# Patient Record
Sex: MALE | Race: BLACK OR AFRICAN AMERICAN | NOT HISPANIC OR LATINO | Employment: FULL TIME | URBAN - METROPOLITAN AREA
[De-identification: names, ages, dates, MRNs, and addresses within clinical notes are randomized per-mention and may not be internally consistent; named-entity substitution may affect disease eponyms.]

---

## 2017-01-14 ENCOUNTER — HOSPITAL ENCOUNTER (EMERGENCY)
Facility: HOSPITAL | Age: 42
Discharge: HOME | End: 2017-01-14
Attending: EMERGENCY MEDICINE

## 2017-01-14 VITALS
WEIGHT: 220 LBS | BODY MASS INDEX: 32.58 KG/M2 | HEIGHT: 69 IN | TEMPERATURE: 99 F | SYSTOLIC BLOOD PRESSURE: 158 MMHG | RESPIRATION RATE: 20 BRPM | OXYGEN SATURATION: 99 % | HEART RATE: 54 BPM | DIASTOLIC BLOOD PRESSURE: 79 MMHG

## 2017-01-14 DIAGNOSIS — R07.9 CHEST PAIN: ICD-10-CM

## 2017-01-14 DIAGNOSIS — R03.0 ELEVATED BLOOD PRESSURE READING: Primary | ICD-10-CM

## 2017-01-14 LAB
ALBUMIN SERPL BCP-MCNC: 4 G/DL
ALP SERPL-CCNC: 38 U/L
ALT SERPL W/O P-5'-P-CCNC: 23 U/L
ANION GAP SERPL CALC-SCNC: 7 MMOL/L
AST SERPL-CCNC: 19 U/L
BASOPHILS # BLD AUTO: 0.01 K/UL
BASOPHILS NFR BLD: 0.1 %
BILIRUB SERPL-MCNC: 0.6 MG/DL
BUN SERPL-MCNC: 10 MG/DL
CALCIUM SERPL-MCNC: 8.7 MG/DL
CHLORIDE SERPL-SCNC: 109 MMOL/L
CO2 SERPL-SCNC: 26 MMOL/L
CREAT SERPL-MCNC: 1.2 MG/DL
D DIMER PPP IA.FEU-MCNC: <0.19 MG/L FEU
DIFFERENTIAL METHOD: ABNORMAL
EOSINOPHIL # BLD AUTO: 0.1 K/UL
EOSINOPHIL NFR BLD: 0.7 %
ERYTHROCYTE [DISTWIDTH] IN BLOOD BY AUTOMATED COUNT: 13.8 %
EST. GFR  (AFRICAN AMERICAN): >60 ML/MIN/1.73 M^2
EST. GFR  (NON AFRICAN AMERICAN): >60 ML/MIN/1.73 M^2
GLUCOSE SERPL-MCNC: 95 MG/DL
HCT VFR BLD AUTO: 38.1 %
HGB BLD-MCNC: 13.9 G/DL
LYMPHOCYTES # BLD AUTO: 3.7 K/UL
LYMPHOCYTES NFR BLD: 42.3 %
MCH RBC QN AUTO: 30.8 PG
MCHC RBC AUTO-ENTMCNC: 36.5 %
MCV RBC AUTO: 85 FL
MONOCYTES # BLD AUTO: 0.5 K/UL
MONOCYTES NFR BLD: 5.2 %
NEUTROPHILS # BLD AUTO: 4.5 K/UL
NEUTROPHILS NFR BLD: 51.7 %
PLATELET # BLD AUTO: 205 K/UL
PMV BLD AUTO: 9.1 FL
POTASSIUM SERPL-SCNC: 3.7 MMOL/L
PROT SERPL-MCNC: 7 G/DL
RBC # BLD AUTO: 4.51 M/UL
SODIUM SERPL-SCNC: 142 MMOL/L
TROPONIN I SERPL DL<=0.01 NG/ML-MCNC: <0.006 NG/ML
WBC # BLD AUTO: 8.79 K/UL

## 2017-01-14 PROCEDURE — 99284 EMERGENCY DEPT VISIT MOD MDM: CPT | Mod: 25

## 2017-01-14 PROCEDURE — 93005 ELECTROCARDIOGRAM TRACING: CPT

## 2017-01-14 PROCEDURE — 25000003 PHARM REV CODE 250: Performed by: EMERGENCY MEDICINE

## 2017-01-14 PROCEDURE — 96374 THER/PROPH/DIAG INJ IV PUSH: CPT

## 2017-01-14 PROCEDURE — 80053 COMPREHEN METABOLIC PANEL: CPT

## 2017-01-14 PROCEDURE — 84484 ASSAY OF TROPONIN QUANT: CPT

## 2017-01-14 PROCEDURE — 96361 HYDRATE IV INFUSION ADD-ON: CPT

## 2017-01-14 PROCEDURE — 85025 COMPLETE CBC W/AUTO DIFF WBC: CPT

## 2017-01-14 PROCEDURE — 85379 FIBRIN DEGRADATION QUANT: CPT

## 2017-01-14 RX ORDER — FAMOTIDINE 10 MG/ML
20 INJECTION INTRAVENOUS
Status: COMPLETED | OUTPATIENT
Start: 2017-01-14 | End: 2017-01-14

## 2017-01-14 RX ORDER — ACETAMINOPHEN 325 MG/1
650 TABLET ORAL
Status: COMPLETED | OUTPATIENT
Start: 2017-01-14 | End: 2017-01-14

## 2017-01-14 RX ORDER — IBUPROFEN 600 MG/1
600 TABLET ORAL EVERY 6 HOURS PRN
Qty: 30 TABLET | Refills: 0 | Status: SHIPPED | OUTPATIENT
Start: 2017-01-14 | End: 2021-10-22 | Stop reason: CLARIF

## 2017-01-14 RX ADMIN — SODIUM CHLORIDE 1000 ML: 0.9 INJECTION, SOLUTION INTRAVENOUS at 10:01

## 2017-01-14 RX ADMIN — FAMOTIDINE 20 MG: 10 INJECTION, SOLUTION INTRAVENOUS at 10:01

## 2017-01-14 RX ADMIN — ACETAMINOPHEN 650 MG: 325 TABLET ORAL at 09:01

## 2017-01-14 NOTE — ED AVS SNAPSHOT
[facility]  [place] [id number]               Gus Valles Jr.   2017  8:33 PM   ED    Description:  Male : 1975   Department:  [facility]           Your Care was Coordinated By:     Provider Role From To    Candace Alvarez MD Attending Provider 17 [phone] --      Reason for Visit     Chest Pain           Diagnoses this Visit        Comments    Elevated blood pressure reading    -  Primary     Chest pain           ED Disposition     ED Disposition Condition Comment    Discharge             To Do List            These Medications        Disp Refills Start End    ibuprofen (ADVIL,MOTRIN) 600 MG tablet 30 tablet 0 2017     Take 1 tablet (600 mg total) by mouth every 6 (six) hours as needed for Pain. - Oral      [facility]     [facility] -  Assistance  Registered nurses in the [facility] provide clinical advisement, health education, appointment booking, and other advisory services.  Call for this free service at [phone].             Medications           Message regarding Medications     Verify the changes and/or additions to your medication regime listed below are the same as discussed with your clinician today.  If any of these changes or additions are incorrect, please notify your healthcare provider.        START taking these NEW medications        Refills    ibuprofen (ADVIL,MOTRIN) 600 MG tablet 0    Sig: Take 1 tablet (600 mg total) by mouth every 6 (six) hours as needed for Pain.    Class: Print    Route: Oral      These medications were administered today        Dose Freq    sodium chloride 0.9% bolus 1,000 mL 1,000 mL ED 1 Time    Sig: Inject 1,000 mLs into the vein ED 1 Time.    Class: Normal    Route: Intravenous    famotidine (PF) 20 mg/2 mL injection 20 mg 20 mg ED 1 Time    Sig: Inject 2 mLs (20 mg total) into the vein ED 1 Time.    Class: Normal    Route:  "Intravenous    acetaminophen tablet 650 mg 650 mg ED 1 Time    Sig: Take 2 tablets (650 mg total) by mouth ED 1 Time.    Class: Normal    Route: Oral           Verify that the below list of medications is an accurate representation of the medications you are currently taking.  If none reported, the list may be blank. If incorrect, please contact your healthcare provider. Carry this list with you in case of emergency.           Current Medications     aspirin 81 MG Chew Take 81 mg by mouth once daily.    cephALEXin (KEFLEX) 500 MG capsule Take 500 mg by mouth 4 (four) times daily.    hydrocodone-acetaminophen 5-325mg (NORCO) 5-325 mg per tablet Take 1 tablet by mouth every 4 (four) hours as needed for Pain.    ibuprofen (ADVIL,MOTRIN) 600 MG tablet Take 1 tablet (600 mg total) by mouth every 6 (six) hours as needed for Pain.    lisinopril-hydrochlorothiazide (PRINZIDE,ZESTORETIC) 10-12.5 mg per tablet Take 1 tablet by mouth once daily. Establish care with a primary physician for further management.    oxycodone-acetaminophen (PERCOCET) 5-325 mg per tablet Take 1 tablet by mouth every 4 (four) hours as needed for Pain.           Clinical Reference Information           Your Vitals Were     BP Pulse Temp Resp Height Weight    174/100 68 98.5 °F (36.9 °C) (Oral) 20 5' 9" (1.753 m) 99.8 kg (220 lb)    SpO2 BMI             99% 32.49 kg/m2         Allergies as of 1/14/2017     No Known Allergies      Immunizations Administered on Date of Encounter - 1/14/2017     None      ED Micro, Lab, POCT     Start Ordered       Status Ordering Provider    01/14/17 2124 01/14/17 2123  CBC auto differential  STAT      Final result     01/14/17 2124 01/14/17 2123  Comprehensive metabolic panel  STAT      Final result     01/14/17 2124 01/14/17 2123  D dimer, quantitative  STAT      Final result     01/14/17 2124 01/14/17 2123  Troponin I  STAT      Final result       ED Imaging Orders     Start Ordered       Status Ordering Provider    " 01/14/17 2039 01/14/17 2038  X-Ray Chest PA And Lateral  1 time imaging      Final result         Discharge Instructions         Noncardiac Chest Pain    Based on your visit today, the health care provider doesnt know what is causing your chest pain. In most cases, people who come to the emergency department with chest pain dont have a problem with their heart. Instead, the pain is caused by other conditions. These may be problems with the lungs, muscles, bones, digestive tract, nerves, or mental health.  Lung problems  · Inflammation around the lungs (pleurisy)  · Collapsed lung (pneumothorax)  · Fluid around the lungs (pleural effusion)  · Lung cancer. This is a rare cause of chest pain.  Muscle or bone problems  · Inflamed cartilage between the ribs (pleurisy)  · Fibromyalgia  · Rheumatoid arthritis  Digestive system problems  · Reflux  · Stomach ulcer  · Spasms of the esophagus  · Gall stones  · Gallbladder inflammation  Mental health conditions  · Panic or anxiety attacks  · Emotional distress  Your condition doesnt seem serious and your pain doesnt appear to be coming from your heart. But sometimes the signs of a serious problem take more time to appear. Watch for the warning signs listed below.  Home care  Follow these guidelines when caring for yourself at home:  · Rest today and avoid strenuous activity.  · Take any prescribed medicine as directed.  Follow-up care  Follow up with your health care provider, or as advised, if you dont start to feel better within 24 hours.  When to seek medical advice  Call your health care provider right away if any of these occur:  · A change in the type of pain. Call if it feels different, becomes more serious, lasts longer, or begins to spread into your shoulder, arm, neck, jaw, or back.  · Shortness of breath  · You feel more pain when you breathe  · Cough with dark-colored mucus or blood  · Weakness, dizziness, or fainting  · Fever of 100.4ºF (38ºC) or higher, or as  directed by your health care provider  · Swelling, pain, or redness in one leg  © 4239-0429 [employer]. [address] [place] [address], [place] [address]. All rights reserved. This information is not intended as a substitute for professional medical care. Always follow your healthcare professional's instructions.          Chest Wall Pain: Costochondritis    The chest pain that you have had today is caused by costochondritis. This condition is caused by an inflammation of the cartilage joining your ribs to your breastbone. It is not caused by heart or lung problems. The inflammation may have been brought on by a blow to the chest, lifting heavy objects, intense exercise, or an illness that made you cough and sneeze. It often occurs during times of emotional stress. It can be painful, but it is not dangerous. It usually goes away in 1 to 2 weeks. But it may happen again. Rarely, a more serious condition may cause symptoms similar to costochondritis. Thats why its important to watch for the warning signs listed below.  Home care  Follow these guidelines when caring for yourself at home:  · If you feel that emotional stress is a cause of your condition, try to figure out the sources of that stress. It may not be obvious! Learn ways to deal with the stress in your life. This can include regular exercise, muscle relaxation, meditation, or simply taking time out for yourself. For more information about this, talk with your health care provider. Or go to your local library and look at books on stress reduction.  · You may use acetaminophen or ibuprofen to control pain, unless another pain medicine was prescribed. If you have liver disease or ever had a stomach ulcer, talk with your health care provider before using these medicines.  · You can also help ease pain by using a hot, wet compress or heating pad. Use this with or without a medicated skin cream that helps relieves pain.  · Do stretching exercise as  advised by your provider.  · Take any prescribed medicines as directed.  Follow-up care  Follow up with your health care provider, or as advised, if you do not start to get better in the next 2 days.  When to seek medical advice  Call your health care provider right away if any of these occur:  · A change in the type of pain. Call if it feels different, becomes more serious, lasts longer, or spreads into your shoulder, arm, neck, jaw, or back.  · Shortness of breath or pain gets worse when you breathe  · Weakness, dizziness, or fainting  · Cough with dark-colored sputum (phlegm) or blood  · Abdominal pain  · Dark red or black stools  · Fever of 100.4ºF (38ºC) or higher, or as directed by your health care provider  © 9468-2788 [employer]. [address] [place] [address]. All rights reserved. This information is not intended as a substitute for professional medical care. Always follow your healthcare professional's instructions.          [facility] Sign-Up     Activating your [facility] account is as easy as 1-2-3!     1) Visit [email or web address], select Sign Up Now, enter this activation code and your date of birth, then select Next.  [email or web address]  Expires: 2/28/2017 11:14 PM      2) Create a username and password to use when you visit [facility] in the future and select a security question in case you lose your password and select Next.    3) Enter your e-mail address and click Sign Up!    Additional Information  If you have questions, please e-mail [email or web address] or call [phone] to talk to our [facility] staff. Remember, [facility] is NOT to be used for urgent needs. For medical emergencies, dial 911.          [facility] complies with applicable Federal civil rights laws and does not discriminate on the basis of race, color, national origin, age, disability, or sex.        Language Assistance Services     ATTENTION: Language assistance services are available, free  of charge. Please call [phone].      ATENCIÓN: Si habla español, tiene a del valle disposición servicios gratuitos de asistencia lingüística. Llame al [phone].     CHÚ Ý: N?u b?n nói Ti?ng Vi?t, có các d?ch v? h? tr? ngôn ng? mi?n phí dành cho b?n. G?i s? [phone].

## 2017-01-15 NOTE — ED PROVIDER NOTES
Encounter Date: 1/14/2017    SCRIBE #1 NOTE: I, Rios Laird, am scribing for, and in the presence of,  Candace Alvarez MD . I have scribed the following portions of the note - Other sections scribed: HPI, ROS and PE .       History     Chief Complaint   Patient presents with    Chest Pain     Right sided chest pain since this AM, constant, tightness w/ associated SOB.      Review of patient's allergies indicates:  No Known Allergies  HPI Comments: CC: Chest pain     HPI: This 41 y.o female with PMHx of HTN presents to the ED c/o acute onset constant and progressively worsening R chest pain (6/10) that started 13 hours ago. Pt reports pain started after getting off his truck and walking.  Additionally, pt reports it hurts to breath deeply. Pt denies pain after eating. Pt denies chest pain with palpations, SOB, and cough. Pt attempts treatment with Mallory, which provided slight relief. Pt denies recent heavy lifting, long distance travel, history of blood clots in legs. Pt denies fever, chills, sore throat, or any other associated symptoms.  Pt reports similar chest pain last month, but symptoms resolved and did not receive medical care. Pt states he has untreated HTN. Pt states he is not on any daily medication. Pt states he takes fish oil and potassium vitamins daily.     The history is provided by the patient. No  was used.     Past Medical History   Diagnosis Date    Hypertension      Past Medical History Pertinent Negatives   Diagnosis Date Noted    Asthma 3/19/2013    Cancer 3/19/2013    Diabetes mellitus 3/19/2013    Migraine headache 12/10/2013     Past Surgical History   Procedure Laterality Date    Cholecystectomy       Family History   Problem Relation Age of Onset    Hypertension Mother     Diabetes Father     Hypertension Father      Social History   Substance Use Topics    Smoking status: Never Smoker    Smokeless tobacco: Never Used    Alcohol use Yes      Comment:  social use     Review of Systems   Constitutional: Negative for chills and fever.   HENT: Negative for rhinorrhea and sore throat.    Eyes: Negative for pain.   Respiratory: Negative for cough and shortness of breath.    Cardiovascular: Positive for chest pain.   Gastrointestinal: Negative for diarrhea, nausea and vomiting.   Genitourinary: Negative for dysuria and frequency.   Musculoskeletal: Negative for back pain and neck pain.   Skin: Negative for rash.   Neurological: Negative for light-headedness and headaches.       Physical Exam   Initial Vitals   BP Pulse Resp Temp SpO2   01/14/17 2030 01/14/17 2030 01/14/17 2030 01/14/17 2030 01/14/17 2030   221/112 75 20 98.5 °F (36.9 °C) 99 %     Physical Exam    Nursing note and vitals reviewed.  Constitutional: He appears well-developed and well-nourished.  Non-toxic appearance. No distress.   HENT:   Head: Normocephalic and atraumatic.   Eyes: Conjunctivae and EOM are normal. Pupils are equal, round, and reactive to light.   No conjunctival pallor   Neck: Normal range of motion. Neck supple. No JVD present.   Cardiovascular: Normal rate and normal heart sounds.   Pulmonary/Chest: Effort normal and breath sounds normal. No respiratory distress. He exhibits no tenderness.   Abdominal: Soft. Normal appearance and bowel sounds are normal. There is no tenderness.   Musculoskeletal: Normal range of motion.        Right lower leg: He exhibits no tenderness.        Left lower leg: He exhibits no tenderness.   Neurological: He is alert and oriented to person, place, and time. He has normal strength. No cranial nerve deficit or sensory deficit.   Skin: Skin is warm and dry.   Psychiatric: He has a normal mood and affect. His behavior is normal. Thought content normal.         ED Course   Procedures  Labs Reviewed   CBC W/ AUTO DIFFERENTIAL   COMPREHENSIVE METABOLIC PANEL   D DIMER, QUANTITATIVE   TROPONIN I     EKG Readings: (Independently Interpreted)   Initial Reading: No  STEMI.   Normal sinus rhythm, rate 60, normal axis, normal intervals, no STEMI          Medical Decision Making:   Initial Assessment:   Urgent evaluation of 41-year-old gentleman with poorly controlled hypertension here with 1 day of right-sided chest discomfort, her son deep inspiration.  Possible heavy lifting yesterday when moving a swing, but no other trauma.  Patient denies cough, no hemoptysis.  No history of DVT or PE, or recent illness.  Vital signs notable for elevated blood pressure, otherwise no tachypnea or hypoxia.  Patient is well-appearing in no respiratory distress.  Differential includes ACS, pericarditis, pneumothorax, pneumonia, costochondritis, muscular strain, or PE.   Clinical Tests:   Lab Tests: Ordered and Reviewed  Radiological Study: Ordered and Reviewed  Medical Tests: Ordered and Reviewed  ED Management:  Patient felt much improved after Tylenol, IV fluids and Pepcid.  Chest x-ray without infiltrate, nor pneumothorax, negative troponin, no evidence of anemia.  D-dimer.  Suspect etiology is muscular or  Costrochondritis, will dc home with NSAIDs and strict fu PCP regarding HTN- which is now 179/97 without intervention, and otherwise, no evidence of end organ damage.    Pt agreeable to plan for discharge home. I feel that pt is stable for discharge and management as an outpatient and no further intervention is needed at this time. Pt is comfortable returning to the ED if needed with any new or worsening symptoms. ED course and all test results discussed with patient, all questions answered, patient demonstrated understanding.The patient was advised to follow up with a primary care provider in 24-48 hours.                Scribe Attestation:   Scribe #1: I performed the above scribed service and the documentation accurately describes the services I performed. I attest to the accuracy of the note.    Attending Attestation:           Physician Attestation for Scribe:  Physician Attestation  Statement for Scribe #1: I, Candace Alvarez MD , reviewed documentation, as scribed by Rios Laird  in my presence, and it is both accurate and complete.                 ED Course     Clinical Impression:     1. Elevated blood pressure reading    2. Chest pain        Disposition:   Disposition: Discharged  Condition: Stable       Candace Alvarez MD  01/15/17 0202       Candace Alvarez MD  01/15/17 0222

## 2017-01-15 NOTE — ED TRIAGE NOTES
41 y.o male to the ED c/o 6/10 R sided chest pain since this morning.   PMH of HTN not currently on any medications.   Pain described as pressure and constant.   +SOB with deep inspiration, denies n/v/f.   Will continue to monitor.

## 2017-01-15 NOTE — DISCHARGE INSTRUCTIONS
Noncardiac Chest Pain    Based on your visit today, the health care provider doesnt know what is causing your chest pain. In most cases, people who come to the emergency department with chest pain dont have a problem with their heart. Instead, the pain is caused by other conditions. These may be problems with the lungs, muscles, bones, digestive tract, nerves, or mental health.  Lung problems  · Inflammation around the lungs (pleurisy)  · Collapsed lung (pneumothorax)  · Fluid around the lungs (pleural effusion)  · Lung cancer. This is a rare cause of chest pain.  Muscle or bone problems  · Inflamed cartilage between the ribs (pleurisy)  · Fibromyalgia  · Rheumatoid arthritis  Digestive system problems  · Reflux  · Stomach ulcer  · Spasms of the esophagus  · Gall stones  · Gallbladder inflammation  Mental health conditions  · Panic or anxiety attacks  · Emotional distress  Your condition doesnt seem serious and your pain doesnt appear to be coming from your heart. But sometimes the signs of a serious problem take more time to appear. Watch for the warning signs listed below.  Home care  Follow these guidelines when caring for yourself at home:  · Rest today and avoid strenuous activity.  · Take any prescribed medicine as directed.  Follow-up care  Follow up with your health care provider, or as advised, if you dont start to feel better within 24 hours.  When to seek medical advice  Call your health care provider right away if any of these occur:  · A change in the type of pain. Call if it feels different, becomes more serious, lasts longer, or begins to spread into your shoulder, arm, neck, jaw, or back.  · Shortness of breath  · You feel more pain when you breathe  · Cough with dark-colored mucus or blood  · Weakness, dizziness, or fainting  · Fever of 100.4ºF (38ºC) or higher, or as directed by your health care provider  · Swelling, pain, or redness in one leg  © 1719-1190 [employer] [address]  [place] [address]. All rights reserved. This information is not intended as a substitute for professional medical care. Always follow your healthcare professional's instructions.          Chest Wall Pain: Costochondritis    The chest pain that you have had today is caused by costochondritis. This condition is caused by an inflammation of the cartilage joining your ribs to your breastbone. It is not caused by heart or lung problems. The inflammation may have been brought on by a blow to the chest, lifting heavy objects, intense exercise, or an illness that made you cough and sneeze. It often occurs during times of emotional stress. It can be painful, but it is not dangerous. It usually goes away in 1 to 2 weeks. But it may happen again. Rarely, a more serious condition may cause symptoms similar to costochondritis. Thats why its important to watch for the warning signs listed below.  Home care  Follow these guidelines when caring for yourself at home:  · If you feel that emotional stress is a cause of your condition, try to figure out the sources of that stress. It may not be obvious! Learn ways to deal with the stress in your life. This can include regular exercise, muscle relaxation, meditation, or simply taking time out for yourself. For more information about this, talk with your health care provider. Or go to your local library and look at books on stress reduction.  · You may use acetaminophen or ibuprofen to control pain, unless another pain medicine was prescribed. If you have liver disease or ever had a stomach ulcer, talk with your health care provider before using these medicines.  · You can also help ease pain by using a hot, wet compress or heating pad. Use this with or without a medicated skin cream that helps relieves pain.  · Do stretching exercise as advised by your provider.  · Take any prescribed medicines as directed.  Follow-up care  Follow up with your health care  provider, or as advised, if you do not start to get better in the next 2 days.  When to seek medical advice  Call your health care provider right away if any of these occur:  · A change in the type of pain. Call if it feels different, becomes more serious, lasts longer, or spreads into your shoulder, arm, neck, jaw, or back.  · Shortness of breath or pain gets worse when you breathe  · Weakness, dizziness, or fainting  · Cough with dark-colored sputum (phlegm) or blood  · Abdominal pain  · Dark red or black stools  · Fever of 100.4ºF (38ºC) or higher, or as directed by your health care provider  © 1649-2030 [employer]. [address] [place] [address], [place] [address]. All rights reserved. This information is not intended as a substitute for professional medical care. Always follow your healthcare professional's instructions.

## 2017-08-16 ENCOUNTER — HOSPITAL ENCOUNTER (EMERGENCY)
Facility: HOSPITAL | Age: 42
Discharge: HOME | End: 2017-08-16
Attending: EMERGENCY MEDICINE | Admitting: EMERGENCY MEDICINE

## 2017-08-16 VITALS
HEIGHT: 68 IN | WEIGHT: 220 LBS | TEMPERATURE: 100 F | BODY MASS INDEX: 33.34 KG/M2 | HEART RATE: 86 BPM | DIASTOLIC BLOOD PRESSURE: 105 MMHG | SYSTOLIC BLOOD PRESSURE: 179 MMHG | RESPIRATION RATE: 18 BRPM | OXYGEN SATURATION: 99 %

## 2017-08-16 DIAGNOSIS — J36 PERITONSILLAR ABSCESS: Primary | ICD-10-CM

## 2017-08-16 LAB
ANION GAP SERPL CALC-SCNC: 10 MMOL/L
BASOPHILS # BLD AUTO: 0.02 K/UL
BASOPHILS NFR BLD: 0.1 %
BUN SERPL-MCNC: 10 MG/DL
CALCIUM SERPL-MCNC: 9.4 MG/DL
CHLORIDE SERPL-SCNC: 107 MMOL/L
CO2 SERPL-SCNC: 23 MMOL/L
CREAT SERPL-MCNC: 1.1 MG/DL
DEPRECATED S PYO AG THROAT QL EIA: NEGATIVE
DIFFERENTIAL METHOD: ABNORMAL
EOSINOPHIL # BLD AUTO: 0 K/UL
EOSINOPHIL NFR BLD: 0.1 %
ERYTHROCYTE [DISTWIDTH] IN BLOOD BY AUTOMATED COUNT: 13.5 %
EST. GFR  (AFRICAN AMERICAN): >60 ML/MIN/1.73 M^2
EST. GFR  (NON AFRICAN AMERICAN): >60 ML/MIN/1.73 M^2
GLUCOSE SERPL-MCNC: 105 MG/DL
HCT VFR BLD AUTO: 38.9 %
HGB BLD-MCNC: 14.1 G/DL
LACTATE SERPL-SCNC: 0.8 MMOL/L
LYMPHOCYTES # BLD AUTO: 3 K/UL
LYMPHOCYTES NFR BLD: 20 %
MCH RBC QN AUTO: 30.9 PG
MCHC RBC AUTO-ENTMCNC: 36.2 G/DL
MCV RBC AUTO: 85 FL
MONOCYTES # BLD AUTO: 1 K/UL
MONOCYTES NFR BLD: 6.8 %
NEUTROPHILS # BLD AUTO: 11 K/UL
NEUTROPHILS NFR BLD: 73 %
PLATELET # BLD AUTO: 177 K/UL
PMV BLD AUTO: 9 FL
POTASSIUM SERPL-SCNC: 3.9 MMOL/L
RBC # BLD AUTO: 4.56 M/UL
SODIUM SERPL-SCNC: 140 MMOL/L
WBC # BLD AUTO: 15.04 K/UL

## 2017-08-16 PROCEDURE — 25500020 PHARM REV CODE 255: Performed by: EMERGENCY MEDICINE

## 2017-08-16 PROCEDURE — 96365 THER/PROPH/DIAG IV INF INIT: CPT

## 2017-08-16 PROCEDURE — 80048 BASIC METABOLIC PNL TOTAL CA: CPT

## 2017-08-16 PROCEDURE — 99285 EMERGENCY DEPT VISIT HI MDM: CPT

## 2017-08-16 PROCEDURE — 85025 COMPLETE CBC W/AUTO DIFF WBC: CPT

## 2017-08-16 PROCEDURE — 87081 CULTURE SCREEN ONLY: CPT

## 2017-08-16 PROCEDURE — 87147 CULTURE TYPE IMMUNOLOGIC: CPT

## 2017-08-16 PROCEDURE — 25000003 PHARM REV CODE 250: Performed by: EMERGENCY MEDICINE

## 2017-08-16 PROCEDURE — 96375 TX/PRO/DX INJ NEW DRUG ADDON: CPT

## 2017-08-16 PROCEDURE — 99284 EMERGENCY DEPT VISIT MOD MDM: CPT | Mod: ,,, | Performed by: PHYSICIAN ASSISTANT

## 2017-08-16 PROCEDURE — 63600175 PHARM REV CODE 636 W HCPCS: Performed by: EMERGENCY MEDICINE

## 2017-08-16 PROCEDURE — 63600175 PHARM REV CODE 636 W HCPCS: Performed by: PHYSICIAN ASSISTANT

## 2017-08-16 PROCEDURE — 42700 I&D ABSCESS PERITONSILLAR: CPT

## 2017-08-16 PROCEDURE — 96372 THER/PROPH/DIAG INJ SC/IM: CPT

## 2017-08-16 PROCEDURE — 87076 CULTURE ANAEROBE IDENT EACH: CPT | Mod: 59

## 2017-08-16 PROCEDURE — 25000003 PHARM REV CODE 250: Performed by: PHYSICIAN ASSISTANT

## 2017-08-16 PROCEDURE — 87070 CULTURE OTHR SPECIMN AEROBIC: CPT

## 2017-08-16 PROCEDURE — 83605 ASSAY OF LACTIC ACID: CPT

## 2017-08-16 PROCEDURE — S0077 INJECTION, CLINDAMYCIN PHOSP: HCPCS | Performed by: PHYSICIAN ASSISTANT

## 2017-08-16 PROCEDURE — 94640 AIRWAY INHALATION TREATMENT: CPT

## 2017-08-16 PROCEDURE — 87880 STREP A ASSAY W/OPTIC: CPT

## 2017-08-16 PROCEDURE — 87075 CULTR BACTERIA EXCEPT BLOOD: CPT

## 2017-08-16 RX ORDER — OXYCODONE AND ACETAMINOPHEN 5; 325 MG/1; MG/1
1 TABLET ORAL EVERY 6 HOURS PRN
Qty: 18 TABLET | Refills: 0 | Status: SHIPPED | OUTPATIENT
Start: 2017-08-16 | End: 2017-08-23

## 2017-08-16 RX ORDER — CLINDAMYCIN HYDROCHLORIDE 300 MG/1
300 CAPSULE ORAL EVERY 8 HOURS
Qty: 21 CAPSULE | Refills: 0 | Status: SHIPPED | OUTPATIENT
Start: 2017-08-16 | End: 2017-08-23

## 2017-08-16 RX ORDER — CLINDAMYCIN PHOSPHATE 900 MG/50ML
900 INJECTION, SOLUTION INTRAVENOUS
Status: COMPLETED | OUTPATIENT
Start: 2017-08-16 | End: 2017-08-16

## 2017-08-16 RX ORDER — HYDROMORPHONE HYDROCHLORIDE 1 MG/ML
0.5 INJECTION, SOLUTION INTRAMUSCULAR; INTRAVENOUS; SUBCUTANEOUS
Status: COMPLETED | OUTPATIENT
Start: 2017-08-16 | End: 2017-08-16

## 2017-08-16 RX ORDER — DEXAMETHASONE SODIUM PHOSPHATE 4 MG/ML
12 INJECTION, SOLUTION INTRA-ARTICULAR; INTRALESIONAL; INTRAMUSCULAR; INTRAVENOUS; SOFT TISSUE
Status: COMPLETED | OUTPATIENT
Start: 2017-08-16 | End: 2017-08-16

## 2017-08-16 RX ORDER — LABETALOL HYDROCHLORIDE 5 MG/ML
10 INJECTION, SOLUTION INTRAVENOUS
Status: COMPLETED | OUTPATIENT
Start: 2017-08-16 | End: 2017-08-16

## 2017-08-16 RX ORDER — LIDOCAINE HYDROCHLORIDE AND EPINEPHRINE 10; 10 MG/ML; UG/ML
10 INJECTION, SOLUTION INFILTRATION; PERINEURAL
Status: COMPLETED | OUTPATIENT
Start: 2017-08-16 | End: 2017-08-16

## 2017-08-16 RX ORDER — LIDOCAINE HYDROCHLORIDE 40 MG/ML
2 INJECTION, SOLUTION RETROBULBAR
Status: COMPLETED | OUTPATIENT
Start: 2017-08-16 | End: 2017-08-16

## 2017-08-16 RX ORDER — MORPHINE SULFATE 10 MG/ML
4 INJECTION INTRAMUSCULAR; INTRAVENOUS; SUBCUTANEOUS ONCE
Status: COMPLETED | OUTPATIENT
Start: 2017-08-16 | End: 2017-08-16

## 2017-08-16 RX ORDER — METHYLPREDNISOLONE SODIUM SUCCINATE 125 MG/2ML
125 INJECTION INTRAMUSCULAR; INTRAVENOUS
Status: COMPLETED | OUTPATIENT
Start: 2017-08-16 | End: 2017-08-16

## 2017-08-16 RX ADMIN — IOHEXOL 70 ML: 350 INJECTION, SOLUTION INTRAVENOUS at 01:08

## 2017-08-16 RX ADMIN — TOPICAL ANESTHETIC: 200 SPRAY DENTAL; PERIODONTAL at 04:08

## 2017-08-16 RX ADMIN — MORPHINE SULFATE 4 MG: 10 INJECTION INTRAVENOUS at 01:08

## 2017-08-16 RX ADMIN — HYDROMORPHONE HYDROCHLORIDE 0.5 MG: 1 INJECTION, SOLUTION INTRAMUSCULAR; INTRAVENOUS; SUBCUTANEOUS at 04:08

## 2017-08-16 RX ADMIN — METHYLPREDNISOLONE SODIUM SUCCINATE 125 MG: 125 INJECTION, POWDER, FOR SOLUTION INTRAMUSCULAR; INTRAVENOUS at 01:08

## 2017-08-16 RX ADMIN — LABETALOL HYDROCHLORIDE 10 MG: 5 INJECTION, SOLUTION INTRAVENOUS at 01:08

## 2017-08-16 RX ADMIN — DEXAMETHASONE SODIUM PHOSPHATE 12 MG: 4 INJECTION, SOLUTION INTRAMUSCULAR; INTRAVENOUS at 04:08

## 2017-08-16 RX ADMIN — CLINDAMYCIN IN 5 PERCENT DEXTROSE 900 MG: 18 INJECTION, SOLUTION INTRAVENOUS at 04:08

## 2017-08-16 RX ADMIN — LIDOCAINE HYDROCHLORIDE 2 ML: 40 INJECTION, SOLUTION RETROBULBAR; TOPICAL at 01:08

## 2017-08-16 RX ADMIN — LIDOCAINE HYDROCHLORIDE,EPINEPHRINE BITARTRATE 10 ML: 10; .01 INJECTION, SOLUTION INFILTRATION; PERINEURAL at 04:08

## 2017-08-16 NOTE — CONSULTS
Otolaryngology - Head and Neck Surgery  Consultation Report    Consultation From: ED    Chief Complaint: peritonsillar abscess    History of Present Illness: 42 y.o. year old male presents with sore throat, odynophagia and ear pain for the last two days. He reports that he had a subjective fever at home. He has never had anything like this before. He went to outside ED where a CT scan was performed demonstrating a left peritonsillar abscess. He was sent to [facility] for ENT evaluation. No shortness of breath.    Review of Systems - Negative except above    Past Medical History: Patient has a past medical history of Hypertension.    Past Surgical History: Patient has a past surgical history that includes Cholecystectomy.    Social History: Patient reports that he has never smoked. He has never used smokeless tobacco. He reports that he drinks alcohol. He reports that he does not use drugs.    Family History: family history includes Diabetes in his father; Hypertension in his father and mother.    Medications:    clindamycin 900 MG/50 ML D5W  900 mg Intravenous ED 1 Time       Allergies: Patient has No Known Allergies.    Physical Exam:  Temp:  [98.7 °F (37.1 °C)-99.9 °F (37.7 °C)] 99.8 °F (37.7 °C)  Pulse:  [] 92  Resp:  [17-18] 18  SpO2:  [97 %-100 %] 97 %  BP: (160-209)/() 160/87        Constitutional: Well appearing / communicating.  NAD.  Eyes: EOM I Bilaterally  Head/Face: Normocephalic.  Negative paranasal sinus pressure/tenderness.  Salivary glands WNL.  House Brackmann I Bilaterally.  Right Ear: External Auditory Canal WNL,TM w/o masses/lesions/perforations.  Auricle WNL.  Left Ear: External Auditory Canal WNL,TM w/o masses/lesions/perforations. Auricle WNL.  Nose: No gross nasal septal deviation. Inferior Turbinates 2+ bilaterally. No septal perforation. No masses/lesions. External nasal skin without masses/lesions.  Oral Cavity: Gingiva/lips WNL.  FOM Soft, no masses palpated. Oral Tongue mobile.  Hard Palate WNL.   Oropharynx: BOT WNL. Bulging of left soft palate with slight displacement of uvula to right  Neck/Lymphatic: No LAD I-VI bilaterally.  No thyromegaly.  No masses noted on exam.  Neuro/Psychiatric: AOx3.  Normal mood and affect.   Cardiovascular: Normal carotid pulses bilaterally, no increasing jugular venous distention noted at cervical region bilaterally.    Respiratory: Normal respiratory effort, no stridor, no retractions noted.          CBC    Recent Labs  Lab 08/16/17  0034   WBC 15.04*   HGB 14.1   HCT 38.9*   MCV 85        BMP    Recent Labs  Lab 08/16/17 0034         K 3.9      CO2 23   BUN 10   CREATININE 1.1   CALCIUM 9.4       Imaging Results          CT Soft Tissue Neck With Contrast (Final result)  Result time 08/16/17 01:50:52    Final result by Kathleen Dunaway MD (08/16/17 01:50:52)                 Impression:      Asymmetric soft tissue swelling in the left palatine tonsillar region with small rim-enhancing collection concerning for small developing left peritonsillar abscess.      Preliminary findings were communicated from Dr. Dunaway to CARLITO Juan for Dr. JOSELITO ROMERO at 01:49:29 on 08/16/17.      Electronically signed by: KATHLEEN DUNAWAY MD  Date:     08/16/17  Time:    01:50              Narrative:    Clinical indication: 42 year-old male with left-sided throat pain.    Comparison: None.    Technique: 2.5 mm axial images with sagittal and coronal reformats were obtained through the neck following administration of 70 cc Omnipaque 350 IV contrast.    Findings:  The visualized portion of the brain demonstrates no gross abnormality.  Orbits are grossly normal in appearance.  Visualized paranasal sinuses and mastoid air cells are clear.  Parotid glands and submandibular glands are normal and symmetric in size.  Thyroid is unremarkable in appearance.    There is asymmetric soft tissue swelling seen in the region of the left palatine tonsils.  There  is small rim-enhancing collection concerning for developing left peritonsillar abscess which measures 1.3 x 0.7 x 1.4 cm.  No evidence of retropharyngeal abscess.  Mild mass effect seen upon the nasopharynx in this region.  Vocal cords are opposed presumably due to breath holding.  Airway is otherwise grossly patent.    Partially visualized lung apices are clear.  Vasculature of the neck appears grossly patent.  No acute osseous abnormalities identified.                               Assessment: 41 yo man presenting with left peritonsillar abscess     Plan:  -I&D performed, see below for procedure note.  -culture of purulent material collected, please send for analysis  -ok for discharge home with PO clindamycin, pain control  -instructed patient to return if symptoms worsen or he does not improve  -follow up with ENT as needed  -d/w Dr. Prince        Procedure: after informed consent was obtained, the patients tonsillar pillar was anesthetized with cetacaine spray. Next 3 ml of 1% lidocaine with epinephrine 1:100,000 was injected along the left anterior tonsillar pillar. An 18 gauge needle was used to aspirate from the left peritonsillar area, ~0.5 ml of pus was aspirated. Next a curvilinear incision was made along the anterior pillar. Approximately 5 ml of purulent material was expressed. A curved hemostat was used to break up loculations. Hemostasis was excellent. The procedure was deemed complete, the patient tolerated it well.

## 2017-08-16 NOTE — ED PROVIDER NOTES
Encounter Date: 8/15/2017    SCRIBE #1 NOTE: I, Lillie Estrella, am scribing for, and in the presence of,  Dr. Downs. I have scribed the following portions of the note - the APC attestation. Other sections scribed: X-ray.       History     Chief Complaint   Patient presents with    Headache     headache to right side with right eye pain x 3 hours.     Sore Throat     Sore throat x 2 days and worsened overnight and difficulty swallowing     Otalgia     Left ear ache x 2 days.     Transfer     Transfer from [facility] for peritonsilar abscess     42-year-old male transferred from [facility] for ENT evaluation.  Patient has a left-sided peritonsillar abscess identified on CT scan.  He received Solu-Medrol at outside facility but no other medications other than analgesics.  Airways patent.  Patient talking in complete sentences without a muffled voice.          Review of patient's allergies indicates:  No Known Allergies  Past Medical History:   Diagnosis Date    Hypertension      Past Surgical History:   Procedure Laterality Date    CHOLECYSTECTOMY       Family History   Problem Relation Age of Onset    Hypertension Mother     Diabetes Father     Hypertension Father      Social History   Substance Use Topics    Smoking status: Never Smoker    Smokeless tobacco: Never Used    Alcohol use Yes      Comment: social use     Review of Systems   Constitutional: Negative for fever.   HENT: Positive for ear pain, sore throat and trouble swallowing. Negative for dental problem, drooling, facial swelling and voice change.    Respiratory: Negative for shortness of breath.    Cardiovascular: Negative for chest pain.   Gastrointestinal: Negative for nausea.   Genitourinary: Negative for dysuria.   Musculoskeletal: Negative for back pain.   Skin: Negative for rash.   Neurological: Negative for weakness.   Hematological: Does not bruise/bleed easily.       Physical Exam     Initial Vitals [08/15/17 2136]   BP Pulse  Resp Temp SpO2   (!) 167/97 100 18 99.9 °F (37.7 °C) 98 %      MAP       120.33         Physical Exam    Constitutional: Vital signs are normal. He appears well-developed and well-nourished. No distress.   HENT:   Head: Normocephalic and atraumatic.   Right Ear: External ear normal.   Left Ear: External ear normal.   Left-sided peritonsillar abscess difficult to identify on physical exam but there is obvious edema    Otherwise normal exam   Eyes: Conjunctivae are normal.   Cardiovascular: Normal rate and regular rhythm. Exam reveals no friction rub.    No murmur heard.  Pulmonary/Chest: He has no rales. He exhibits no tenderness.   Abdominal: Soft. Normal appearance, normal aorta and bowel sounds are normal.   Musculoskeletal: Normal range of motion.   Neurological: He is alert and oriented to person, place, and time.   Skin: Skin is warm and intact.   Psychiatric: He has a normal mood and affect. His speech is normal and behavior is normal. Cognition and memory are normal.         ED Course   Procedures  Labs Reviewed   CBC W/ AUTO DIFFERENTIAL - Abnormal; Notable for the following:        Result Value    WBC 15.04 (*)     RBC 4.56 (*)     Hematocrit 38.9 (*)     MCHC 36.2 (*)     MPV 9.0 (*)     Gran # 11.0 (*)     All other components within normal limits   THROAT SCREEN, RAPID   CULTURE, STREP A,  THROAT   CULTURE, ANAEROBIC   CULTURE, AEROBIC  (SPECIFY SOURCE)   LACTIC ACID, PLASMA   BASIC METABOLIC PANEL          X-Rays:   Independently Interpreted Readings:   Other Readings:  CT of neck: peritonsillar abscess.     Medical Decision Making:   History:   Old Medical Records: I decided to obtain old medical records.  Independently Interpreted Test(s):   I have ordered and independently interpreted X-rays - see prior notes.  Clinical Tests:   Lab Tests: Ordered and Reviewed  Radiological Study: Ordered and Reviewed  ED Management:  42-year-old male with peritonsillar abscess.  Abscess drained in the ER by ENT.   Patient started on antibiotics and given Decadron.  We'll discharge home with analgesic medication and clindamycin.  Return instructions have been provided.  Patient stable for discharge.  Other:   I have discussed this case with another health care provider.       <> Summary of the Discussion: ENT            Scribe Attestation:   Scribe #1: I performed the above scribed service and the documentation accurately describes the services I performed. I attest to the accuracy of the note.    Attending Attestation:     Physician Attestation Statement for NP/PA:   I discussed this assessment and plan of this patient with the NP/PA, but I did not personally examine the patient. The face to face encounter was performed by the NP/PA.    Other NP/PA Attestation Additions:    History of Present Illness: Peritonsillar abscess.          Physician Attestation for Scribe:  Physician Attestation Statement for Scribe #1: I, Dr. Downs, reviewed documentation, as scribed by Lillie Estrella in my presence, and it is both accurate and complete.                 ED Course     Clinical Impression:   The encounter diagnosis was Peritonsillar abscess.    Disposition:   Disposition: Discharged  Condition: Stable                        Akbar Suarez PA-C  08/16/17 6287       Jorge Downs III, MD  08/16/17 [id number]

## 2017-08-16 NOTE — DISCHARGE INSTRUCTIONS
Take all antibiotics until completed  Take pain medication as needed as directed  Take this medication with caution as it may cause sleepiness or drowsiness  Return to the emergency department with any new complaints

## 2017-08-16 NOTE — ED TRIAGE NOTES
42 y.o male presents to the ED via personal transport w/ friend. The pt reports he has been having a constant frontal headache, sensitive to light/noise, left ear pain and a sore throat since last night.

## 2017-08-16 NOTE — ED PROVIDER NOTES
Encounter Date: 8/15/2017    SCRIBE #1 NOTE: I, Wally Villasenor, am scribing for, and in the presence of,  Kristopher Wood MD. I have scribed the following portions of the note - Other sections scribed: HPI, ROS.       History     Chief Complaint   Patient presents with    Headache     headache to right side with right eye pain x 3 hours.     Sore Throat     Sore throat x 2 days and worsened overnight and difficulty swallowing     Otalgia     Left ear ache x 2 days.      CC: Sore Throat    HPI: This 42 y.o. Male with PMHx HTN and PSHx cholecystectomy presents to the ED c/o severe sore throat which worsened last night, and L otalgia which began x2 days ago. Pt reports associated headache, fever, and elevated blood pressure today. Pt reports taking Nyquil Cold & Sinus and Theraflu at home as tx. Pt denies taking blood pressure medications. Pt denies swimming recently.      The history is provided by the patient and a significant other. No  was used.     Review of patient's allergies indicates:  No Known Allergies  Past Medical History:   Diagnosis Date    Hypertension      Past Surgical History:   Procedure Laterality Date    CHOLECYSTECTOMY       Family History   Problem Relation Age of Onset    Hypertension Mother     Diabetes Father     Hypertension Father      Social History   Substance Use Topics    Smoking status: Never Smoker    Smokeless tobacco: Never Used    Alcohol use Yes      Comment: social use     Review of Systems   Constitutional: Positive for fever. Negative for chills, diaphoresis and fatigue.   HENT: Positive for ear pain (L), sore throat and trouble swallowing. Negative for congestion, drooling, hearing loss, postnasal drip, sinus pressure and sneezing.    Respiratory: Negative for shortness of breath, wheezing and stridor.    Cardiovascular: Negative for chest pain.   Gastrointestinal: Negative for abdominal pain and nausea.   Genitourinary: Negative for dysuria.    Musculoskeletal: Negative for back pain.   Skin: Negative for rash.   Neurological: Positive for headaches. Negative for weakness.   Hematological: Does not bruise/bleed easily.       Physical Exam     Initial Vitals [08/15/17 2136]   BP Pulse Resp Temp SpO2   (!) 167/97 100 18 99.9 °F (37.7 °C) 98 %      MAP       120.33         Physical Exam    Vitals reviewed.  Constitutional: He appears well-developed and well-nourished.   HENT:   Head: Normocephalic and atraumatic.   Right Ear: Tympanic membrane normal.   Left Ear: Tympanic membrane normal.   Nose: Nose normal.   Mouth/Throat: Mucous membranes are normal. Uvula swelling present. No dental abscesses. Posterior oropharyngeal edema present.   Fullness in the left peritonsillar area.   Eyes: EOM are normal. Pupils are equal, round, and reactive to light.   Neck: Trachea normal, normal range of motion and full passive range of motion without pain. Neck supple. No stridor present.   Cardiovascular: Normal rate, regular rhythm, normal heart sounds and intact distal pulses.   Pulmonary/Chest: Breath sounds normal. No respiratory distress. He has no wheezes. He has no rhonchi. He has no rales.   Abdominal: Soft. Bowel sounds are normal.   Musculoskeletal: Normal range of motion.   Neurological: He is alert and oriented to person, place, and time.   Skin: Skin is warm and dry.   Psychiatric: He has a normal mood and affect.         ED Course   Procedures  Labs Reviewed   CBC W/ AUTO DIFFERENTIAL - Abnormal; Notable for the following:        Result Value    WBC 15.04 (*)     RBC 4.56 (*)     Hematocrit 38.9 (*)     MCHC 36.2 (*)     MPV 9.0 (*)     Gran # 11.0 (*)     All other components within normal limits   THROAT SCREEN, RAPID   CULTURE, STREP A,  THROAT   LACTIC ACID, PLASMA   BASIC METABOLIC PANEL             Medical Decision Making:   History:   Old Medical Records: I decided to obtain old medical records.  Initial Assessment:   Medical decision-making:    The  patient received a medical screening exam. If performed, the EKG was independently evaluated by me and is pending final cardiology evaluation.  If performed, all radiographic studies were independently evaluated by me and are pending final radiology evaluation. If labs were ordered, they were reviewed. Vital signs are independently assessed by me.  If performed, the pulse oximetry was independently evaluated by me.  I decided to obtain the patient's past medical record.  If available, I reviewed the patient's past medical record, including most recent labs and radiology reports.    Differential Diagnosis:   Strep pharyngitis, peritonsillar abscess, retropharyngeal abscess.  ED Management:  There is fullness in the left peritonsillar region that was concerning on physical exam.  CT scan findings concerning for peritonsillar abscess.  Antibiotics held until cultures can be obtained by ENT.  Patient will be transferred to [facility] for ENT evaluation.    At the time of transfer, the patient is speaking in full sentences.  He is handling his secretions.  There is no stridor or stertor.     The results and physical exam findings were reviewed with the patient. Pt agrees with assessment, disposition and treatment plan and has no further questions or complaints at this time.    DRAKE Wood M.D. 2:24 AM 8/16/2017              Scribe Attestation:   Scribe #1: I performed the above scribed service and the documentation accurately describes the services I performed. I attest to the accuracy of the note.    Attending Attestation:           Physician Attestation for Scribe:  Physician Attestation Statement for Scribe #1: I, Kristopher Wood MD, reviewed documentation, as scribed by Wally Villasenor in my presence, and it is both accurate and complete.                 ED Course     Clinical Impression:   The encounter diagnosis was Peritonsillar abscess.                           Kristopher Wood MD  08/16/17 0224        Kristopher Wood MD  08/16/17 [id number]

## 2017-08-16 NOTE — ED TRIAGE NOTES
Gus Valles Jr., a 42 y.o. male presents to the ED as transfer from [facility] via EMS for peritonsilar abscess and ENT consult.      Chief Complaint   Patient presents with    Headache     headache to right side with right eye pain x 3 hours.     Sore Throat     Sore throat x 2 days and worsened overnight and difficulty swallowing     Otalgia     Left ear ache x 2 days.     Transfer     Transfer from [facility] for peritonsilar abscess     Review of patient's allergies indicates:  No Known Allergies  Past Medical History:   Diagnosis Date    Hypertension

## 2017-08-16 NOTE — ED NOTES
Gave report to CARLITO Beltran at [facility]. Jessica here to transport patient. Pt in NAD.

## 2017-08-16 NOTE — ED NOTES
Patient co worsening headache, Revitalized and /115.  WIll change for ED bed at this time.  CO photophobia and covering eyes.

## 2017-08-18 LAB
BACTERIA SPEC AEROBE CULT: NORMAL
BACTERIA THROAT CULT: NORMAL

## 2017-08-21 LAB
BACTERIA SPEC ANAEROBE CULT: NORMAL
BACTERIA SPEC ANAEROBE CULT: NORMAL

## 2020-03-11 ENCOUNTER — HOSPITAL ENCOUNTER (EMERGENCY)
Facility: HOSPITAL | Age: 45
Discharge: HOME | End: 2020-03-11
Attending: EMERGENCY MEDICINE
Payer: MEDICAID

## 2020-03-11 VITALS
HEART RATE: 77 BPM | TEMPERATURE: 100 F | BODY MASS INDEX: 35.55 KG/M2 | HEIGHT: 69 IN | WEIGHT: 240 LBS | RESPIRATION RATE: 18 BRPM | OXYGEN SATURATION: 99 % | DIASTOLIC BLOOD PRESSURE: 76 MMHG | SYSTOLIC BLOOD PRESSURE: 125 MMHG

## 2020-03-11 DIAGNOSIS — J06.9 VIRAL URI: Primary | ICD-10-CM

## 2020-03-11 DIAGNOSIS — R05.9 COUGH: ICD-10-CM

## 2020-03-11 LAB
CTP QC/QA: YES
POC MOLECULAR INFLUENZA A AGN: NEGATIVE
POC MOLECULAR INFLUENZA B AGN: NEGATIVE

## 2020-03-11 PROCEDURE — 25000003 PHARM REV CODE 250: Performed by: PHYSICIAN ASSISTANT

## 2020-03-11 PROCEDURE — 99284 EMERGENCY DEPT VISIT MOD MDM: CPT | Mod: 25

## 2020-03-11 PROCEDURE — 87502 INFLUENZA DNA AMP PROBE: CPT

## 2020-03-11 RX ORDER — LISINOPRIL AND HYDROCHLOROTHIAZIDE 10; 12.5 MG/1; MG/1
1 TABLET ORAL DAILY
Qty: 30 TABLET | Refills: 0 | Status: SHIPPED | OUTPATIENT
Start: 2020-03-11 | End: 2020-07-26 | Stop reason: SDUPTHER

## 2020-03-11 RX ORDER — ACETAMINOPHEN 500 MG
1000 TABLET ORAL
Status: COMPLETED | OUTPATIENT
Start: 2020-03-11 | End: 2020-03-11

## 2020-03-11 RX ORDER — PROMETHAZINE HYDROCHLORIDE AND DEXTROMETHORPHAN HYDROBROMIDE 6.25; 15 MG/5ML; MG/5ML
5 SYRUP ORAL EVERY 4 HOURS PRN
Qty: 180 ML | Refills: 0 | Status: SHIPPED | OUTPATIENT
Start: 2020-03-11 | End: 2020-03-21

## 2020-03-11 RX ADMIN — ACETAMINOPHEN 1000 MG: 500 TABLET ORAL at 02:03

## 2020-03-11 NOTE — ED TRIAGE NOTES
Pt c/o fever, cough, headache, N/V, diarrhea that started yesterday. Denies dysuria. Pain is 10/10. Pt took robitussin at 0200 this AM

## 2020-03-11 NOTE — ED PROVIDER NOTES
Encounter Date: 3/11/2020    SCRIBE #1 NOTE: I, Hanna Diaz, am scribing for, and in the presence of,  Lesvia Sarkar PA-C. I have scribed the following portions of the note - Other sections scribed: HPI, ROS, PE.       History     Chief Complaint   Patient presents with    flu like symptoms     The patient reports a headache, cough, vomiting, chest soreness with coughing, diarrhea, and fever since yesterday.      CC: Fever    HPI:  This is a 44 y.o. male with a PMHx of Hypertension who presents to the Emergency Department with a cc of fever(101.9F) that began two days ago. Patient reports associated headache, cough, generalized body aches, post-tussive emesis, and diarrhea (3 episodes). He denies chest pain, shortness of breath, abdominal pain, vomiting, or diarrhea. Patient took Robitussin for his symptoms with minor relief. No sick contact. No recent travel.     The history is provided by the patient.     Review of patient's allergies indicates:  No Known Allergies  Past Medical History:   Diagnosis Date    Hypertension      Past Surgical History:   Procedure Laterality Date    CHOLECYSTECTOMY       Family History   Problem Relation Age of Onset    Hypertension Mother     Diabetes Father     Hypertension Father      Social History     Tobacco Use    Smoking status: Never Smoker    Smokeless tobacco: Never Used   Substance Use Topics    Alcohol use: Yes     Comment: social use    Drug use: No     Review of Systems   Constitutional: Positive for fever. Negative for chills.   HENT: Negative for sore throat.    Eyes: Negative for visual disturbance.   Respiratory: Positive for cough. Negative for shortness of breath.    Cardiovascular: Negative for chest pain.   Gastrointestinal: Positive for diarrhea and vomiting (post-tussive). Negative for abdominal pain and nausea.   Genitourinary: Negative for dysuria.   Musculoskeletal: Positive for myalgias (Generalized body aches). Negative for neck pain.    Skin: Negative for rash.   Allergic/Immunologic: Negative for immunocompromised state.   Neurological: Positive for headaches.   Psychiatric/Behavioral: Negative for dysphoric mood.       Physical Exam     Initial Vitals [03/11/20 1311]   BP Pulse Resp Temp SpO2   (!) 164/79 101 18 (!) 101.9 °F (38.8 °C) 100 %      MAP       --         Physical Exam    Nursing note and vitals reviewed.  Constitutional: He appears well-developed and well-nourished. He is not diaphoretic. No distress.   HENT:   Head: Normocephalic and atraumatic.   Mouth/Throat: Oropharynx is clear and moist. No oropharyngeal exudate.   Eyes: Conjunctivae and EOM are normal. Pupils are equal, round, and reactive to light.   Neck: Normal range of motion. Neck supple.   Cardiovascular: Normal rate, regular rhythm, normal heart sounds and intact distal pulses.   Pulmonary/Chest: Breath sounds normal. No respiratory distress. He has no wheezes. He has no rales.   Abdominal: Soft. Bowel sounds are normal. There is no tenderness. There is no rebound and no guarding.   Musculoskeletal: Normal range of motion. He exhibits no edema or tenderness.   Neurological: He is alert and oriented to person, place, and time. GCS score is 15. GCS eye subscore is 4. GCS verbal subscore is 5. GCS motor subscore is 6.   Skin: Skin is warm and dry.   Psychiatric: He has a normal mood and affect. Thought content normal.         ED Course   Procedures  Labs Reviewed   POCT INFLUENZA A/B MOLECULAR          Imaging Results          X-Ray Chest PA And Lateral (Final result)  Result time 03/11/20 14:49:58    Final result by Nikita Benavidez MD (03/11/20 14:49:58)                 Impression:      No acute abnormality.      Electronically signed by: Nikita Benavidez MD  Date:    03/11/2020  Time:    14:49             Narrative:    EXAMINATION:  XR CHEST PA AND LATERAL    CLINICAL HISTORY:  Cough    TECHNIQUE:  PA and lateral views of the chest were  performed.    COMPARISON:  01/14/2017    FINDINGS:  The lungs are clear, with normal appearance of pulmonary vasculature and no pleural effusion or pneumothorax.    The cardiac silhouette is normal in size. The hilar and mediastinal contours are unremarkable.    Bones are intact.                                       APC / Resident Notes:   This is an evaluation of a 44 y.o. male that presents to the Emergency Department for Fever, Chills, Cough and Body Aches for 2 days. The patient is a non-toxic, febrile, and well appearing male. On physical exam ears and pharynx are without evidence of infection. Appears well hydrated with moist mucus membranes. Neck soft and supple with no meningeal signs or cervical lymphadenopathy. Breath sounds are clear and equal bilaterally with no adventitious breath sounds, tachypnea or respiratory distress with room air pulse ox of 99% and no evidence of hypoxia.     Vital Signs Are Reassuring.  RESULTS: Flu negative. CXR without acute abnormality.    My overall impression is Viral URI. I considered, but at this time, do not suspect OM, OE, strep pharyngitis, meningitis, pneumonia, or acute bacterial sinusitis.    ED Course: Tylenol. D/C Meds: Promethazine-DM. Additional D/C Information: OTC Tylenol/Motrin. The diagnosis, treatment plan, instructions for follow-up and reevaluation with PCP as well as ED return precautions were discussed and understanding was verbalized. All questions or concerns have been addressed.        Scribe Attestation:   Scribe #1: I performed the above scribed service and the documentation accurately describes the services I performed. I attest to the accuracy of the note.                          Clinical Impression:       ICD-10-CM ICD-9-CM   1. Viral URI J06.9 465.9   2. Cough R05 786.2         Disposition:   Disposition: Discharged  Condition: Stable     ED Disposition Condition    Discharge Stable        ED Prescriptions     None        Follow-up  Information    None                     Scribe attestation: I, Lesvia Sarkar, personally performed the services described in this documentation. All medical record entries made by the scribe were at my direction and in my presence. I have reviewed the chart and agree that the record reflects my personal performance and is accurate and complete                   Lesvai Sarkar PA-C  03/11/20 [id number]

## 2020-03-11 NOTE — DISCHARGE INSTRUCTIONS
Take Over-the-counter Tylenol and/or Motrin as directed as needed for fever and pain relief.  Take promethazine DM cough syrup every 4 hr as needed for cough.  Follow-up with your primary care physician in 3-5 days for re-evaluation.  Return to the ED for any concerning symptoms.    Our goal in the emergency department is to always give you outstanding care and exceptional service. You may receive a survey by mail or e-mail in the next week regarding your experience in our ED. We would greatly appreciate your completing and returning the survey. Your feedback provides us with a way to recognize our staff who give very good care and it helps us learn how to improve when your experience was below our aspiration of excellence.

## 2020-07-25 PROCEDURE — 99284 EMERGENCY DEPT VISIT MOD MDM: CPT | Mod: 25

## 2020-07-26 ENCOUNTER — HOSPITAL ENCOUNTER (EMERGENCY)
Facility: HOSPITAL | Age: 45
Discharge: HOME | End: 2020-07-26
Attending: EMERGENCY MEDICINE
Payer: MEDICAID

## 2020-07-26 VITALS
HEART RATE: 80 BPM | OXYGEN SATURATION: 100 % | WEIGHT: 226 LBS | TEMPERATURE: 99 F | SYSTOLIC BLOOD PRESSURE: 228 MMHG | RESPIRATION RATE: 16 BRPM | HEIGHT: 69 IN | DIASTOLIC BLOOD PRESSURE: 113 MMHG | BODY MASS INDEX: 33.47 KG/M2

## 2020-07-26 DIAGNOSIS — I10 HTN (HYPERTENSION): Primary | ICD-10-CM

## 2020-07-26 DIAGNOSIS — E87.6 HYPOKALEMIA: ICD-10-CM

## 2020-07-26 LAB
ANION GAP SERPL CALC-SCNC: 16 MMOL/L (ref 8–16)
BUN SERPL-MCNC: 13 MG/DL (ref 6–30)
CHLORIDE SERPL-SCNC: 104 MMOL/L (ref 95–110)
CREAT SERPL-MCNC: 1.1 MG/DL (ref 0.5–1.4)
GLUCOSE SERPL-MCNC: 102 MG/DL (ref 70–110)
HCT VFR BLD CALC: 38 %PCV (ref 36–54)
POC IONIZED CALCIUM: 1.24 MMOL/L (ref 1.06–1.42)
POC TCO2 (MEASURED): 27 MMOL/L (ref 23–29)
POTASSIUM BLD-SCNC: 3.3 MMOL/L (ref 3.5–5.1)
SAMPLE: ABNORMAL
SODIUM BLD-SCNC: 142 MMOL/L (ref 136–145)

## 2020-07-26 PROCEDURE — 93010 ELECTROCARDIOGRAM REPORT: CPT | Mod: ,,, | Performed by: INTERNAL MEDICINE

## 2020-07-26 PROCEDURE — 93010 EKG 12-LEAD: ICD-10-PCS | Mod: ,,, | Performed by: INTERNAL MEDICINE

## 2020-07-26 PROCEDURE — 82330 ASSAY OF CALCIUM: CPT

## 2020-07-26 PROCEDURE — 93005 ELECTROCARDIOGRAM TRACING: CPT

## 2020-07-26 PROCEDURE — 85014 HEMATOCRIT: CPT

## 2020-07-26 PROCEDURE — 82565 ASSAY OF CREATININE: CPT

## 2020-07-26 PROCEDURE — 84132 ASSAY OF SERUM POTASSIUM: CPT

## 2020-07-26 PROCEDURE — 84295 ASSAY OF SERUM SODIUM: CPT

## 2020-07-26 PROCEDURE — 25000003 PHARM REV CODE 250: Performed by: PHYSICIAN ASSISTANT

## 2020-07-26 PROCEDURE — 99900035 HC TECH TIME PER 15 MIN (STAT)

## 2020-07-26 RX ORDER — AMLODIPINE BESYLATE 5 MG/1
10 TABLET ORAL
Status: COMPLETED | OUTPATIENT
Start: 2020-07-26 | End: 2020-07-26

## 2020-07-26 RX ORDER — AMLODIPINE BESYLATE 5 MG/1
TABLET ORAL
Status: DISCONTINUED
Start: 2020-07-26 | End: 2020-07-26 | Stop reason: HOSPADM

## 2020-07-26 RX ORDER — LISINOPRIL AND HYDROCHLOROTHIAZIDE 10; 12.5 MG/1; MG/1
1 TABLET ORAL DAILY
Qty: 30 TABLET | Refills: 0 | Status: SHIPPED | OUTPATIENT
Start: 2020-07-26 | End: 2023-10-23 | Stop reason: SDUPTHER

## 2020-07-26 RX ADMIN — AMLODIPINE BESYLATE 10 MG: 5 TABLET ORAL at 01:07

## 2020-07-26 NOTE — ED PROVIDER NOTES
Encounter Date: 7/25/2020       History     Chief Complaint   Patient presents with    multiple complaints     c/o headache, indigestion, vomited x 2 today, worse w/ lying down, acid reflux, refill on HTN med, ran out 3 wks ago     44-year-old male with history of hypertension presents to ED complaining of 3 week history of intermittent bilateral blurred vision, nausea with infrequent episodes of emesis, mild frontal headache.  Patient states he has been out of his antihypertensives for at least 1 month.  He states typically once pressure is elevated he experiences the aforementioned symptoms.    He denies any loss of vision, no unilateral weakness, no facial droop, no slurred speech, no confusion, no lightheadedness or dizziness, no weakness or fatigue.  No history of TIA or CVA.    No chest pain or shortness of breath.  He denies headache at this time.  No nausea at this time.    Patient presents to ED for medication refill.        Review of patient's allergies indicates:  No Known Allergies  Past Medical History:   Diagnosis Date    Hypertension      Past Surgical History:   Procedure Laterality Date    CHOLECYSTECTOMY       Family History   Problem Relation Age of Onset    Hypertension Mother     Diabetes Father     Hypertension Father      Social History     Tobacco Use    Smoking status: Never Smoker    Smokeless tobacco: Never Used   Substance Use Topics    Alcohol use: Yes     Comment: social use    Drug use: No     Review of Systems   Constitutional: Negative for appetite change, chills, fatigue and fever.   HENT: Negative for ear discharge and ear pain.    Eyes: Positive for visual disturbance.   Respiratory: Negative for shortness of breath.    Cardiovascular: Negative for chest pain and leg swelling.   Gastrointestinal: Positive for nausea and vomiting. Negative for abdominal pain.   Musculoskeletal: Negative for myalgias, neck pain and neck stiffness.   Neurological: Positive for headaches.  Negative for dizziness, syncope, weakness, light-headedness and numbness.       Physical Exam     Initial Vitals [07/25/20 2243]   BP Pulse Resp Temp SpO2   (!) 192/112 78 18 98.7 °F (37.1 °C) 100 %      MAP       --         Physical Exam    Nursing note and vitals reviewed.  Constitutional: He appears well-developed and well-nourished. He is not diaphoretic. No distress.   Well-appearing and nontoxic.  Resting comfortably on exam table.   HENT:   Head: Normocephalic and atraumatic.   Symmetric smile, tongue midline   Eyes: Conjunctivae and EOM are normal. Pupils are equal, round, and reactive to light.   No nystagmus, normal EOMs bilaterally   Neck: Normal range of motion. Neck supple.   Cardiovascular: Intact distal pulses.   Normal sinus rhythm.  No pretibial edema, no unilateral leg swelling or calf tenderness.   Pulmonary/Chest: Breath sounds normal. No respiratory distress.   Musculoskeletal: Normal range of motion.   Neurological: He is alert and oriented to person, place, and time. GCS score is 15. GCS eye subscore is 4. GCS verbal subscore is 5. GCS motor subscore is 6.   Grossly equal , shrug, biceps, triceps, hip flexion, knee flexion, knee extension, ankle dorsiflexion, ankle plantarflexion strength bilaterally. Negative Romberg. No pronator drift. No UE or LE motor drift. Normal finger to nose bilaterally.    Skin: Skin is warm.   Psychiatric: He has a normal mood and affect. Thought content normal.         ED Course   Procedures  Labs Reviewed   ISTAT PROCEDURE - Abnormal; Notable for the following components:       Result Value    POC Potassium 3.3 (*)     All other components within normal limits   ISTAT CHEM8     EKG Readings: (Independently Interpreted)   Normal sinus rhythm, ventricular rate 70 beats per minute, normal MS interval, normal QT interval.  No evidence of acute ischemia, arrhythmia, or heart block.  No ST elevation.  No significant change from previous dated 01/14/2017.        Imaging Results          CT Head Without Contrast (Final result)  Result time 07/26/20 02:50:15    Final result by Mindy Huitron MD (07/26/20 02:50:15)                 Impression:      No CT evidence of acute intracranial abnormality. If the patient's headaches are sufficiently clinically suspicious, or associated with signs of elevated ICP, focal neurologic deficits, nausea, or vomiting, further evaluation with MRI can be performed if there are no clinical contraindications.      Electronically signed by: Mindy Huitron MD  Date:    07/26/2020  Time:    02:50             Narrative:    EXAMINATION:  CT HEAD WITHOUT CONTRAST    CLINICAL HISTORY:  Headache, acute, normal neuro exam;    TECHNIQUE:  Low dose axial images were obtained through the head.  Coronal and sagittal reformations were also performed. Contrast was not administered.    COMPARISON:  None.    FINDINGS:  There is no acute intracranial hemorrhage, hydrocephalus, midline shift or mass effect. Gray-white matter differentiation appears maintained. The basal cisterns are patent. The mastoid air cells and paranasal sinuses are clear of acute process. The visualized bones of the calvarium demonstrate no acute osseous abnormality.                                 Medical Decision Making:   Differential Diagnosis:   Hypertension, hypertensive urgency, hypertensive emergency, TIA, CVA  Clinical Tests:   Lab Tests: Ordered and Reviewed  Radiological Study: Ordered and Reviewed  Medical Tests: Ordered and Reviewed  ED Management:  Headache, nausea vomiting, blurred vision, the symptoms have occurred in the past with elevated blood pressure.  He has presented to the ED in the past with similar blood pressure.  He has no focal neurologic deficit.  He has no history of TIA or CVA.  I-STAT without evidence of electrolyte dysfunction or acute kidney injury.  CT head without obvious acute abnormality.  I will treat is blood pressure with p.o. Norvasc, I will  discharge him with this typical Prinzide, I will give him information follow-up with primary care provider.  We have discussed return precautions.  He does understand and agree.    Despite elevated BP on d/c, pt denies any symptoms. He states he feels well enough for d/c. I feel he can be safely managed as an outpatient.                                 Clinical Impression:       ICD-10-CM ICD-9-CM   1. HTN (hypertension)  I10 401.9   2. Hypokalemia  E87.6 276.8         Disposition:   Disposition: Discharged  Condition: Stable     ED Disposition Condition    Discharge Stable        ED Prescriptions     Medication Sig Dispense Start Date End Date Auth. Provider    potassium bicarbonate (K-LYTE) disintegrating tablet Take 1 tablet (25 mEq total) by mouth 2 (two) times a day. for 2 days 4 tablet 7/26/2020 7/28/2020 James Zabala PA-C    lisinopriL-hydrochlorothiazide (PRINZIDE,ZESTORETIC) 10-12.5 mg per tablet Take 1 tablet by mouth once daily. Establish care with a primary physician for further management. 30 tablet 7/26/2020 7/26/2021 James Zabala PA-C        Follow-up Information     Follow up With Specialties Details Why Contact Info    [facility] [place]  Schedule an appointment as soon as possible for a visit  To establish primary care physician, for reevaluation [address] [place] [id number] [phone]      [facility]  Schedule an appointment as soon as possible for a visit  To establish primary care physician, For reevaluation [address] [facility] [address]  [phone]      [facility]  As needed, If symptoms worsen [address] [facility] [address] [phone]

## 2020-07-26 NOTE — ED TRIAGE NOTES
Pt arrived to ED via personal transport with chief complaint of hypertension. Pt reports experiencing headache, indigestion, N/V ever since running out of BP meds about a month ago. AAO x 4. In no acute distress. Pt placed on automatic BP cuff and continuous pulse oximeter.

## 2020-07-26 NOTE — ED NOTES
"Pt refused EKG states " I didnt come here for no chest pains. I don't want that thing hooked up on me. She gone get the doctor."  "

## 2020-07-26 NOTE — DISCHARGE INSTRUCTIONS
Take blood pressure medicine as previously prescribed.  Take the potassium for the next 2 days.    Please follow-up and establish care with a primary care provider.  Return to this ED if your symptoms persist or worsen despite treatment, if you begin with severe headache, if you continue with nausea vomiting, if any signs of stroke such as facial droop, slurred speech, one-sided weakness, if you begin with severe chest pain or shortness of breath, if any other problems occur.

## 2024-04-10 ENCOUNTER — OFFICE VISIT (OUTPATIENT)
Dept: PRIMARY CARE CLINIC | Facility: CLINIC | Age: 49
End: 2024-04-10
Payer: COMMERCIAL

## 2024-04-10 VITALS
RESPIRATION RATE: 16 BRPM | DIASTOLIC BLOOD PRESSURE: 90 MMHG | OXYGEN SATURATION: 100 % | SYSTOLIC BLOOD PRESSURE: 158 MMHG | WEIGHT: 231.94 LBS | HEART RATE: 99 BPM | BODY MASS INDEX: 35.15 KG/M2 | TEMPERATURE: 99 F | HEIGHT: 68 IN

## 2024-04-10 DIAGNOSIS — Z80.42 FAMILY HISTORY OF PROSTATE CANCER: ICD-10-CM

## 2024-04-10 DIAGNOSIS — Z13.1 SCREENING FOR DIABETES MELLITUS: ICD-10-CM

## 2024-04-10 DIAGNOSIS — Z11.3 SCREENING EXAMINATION FOR STI: ICD-10-CM

## 2024-04-10 DIAGNOSIS — Z00.00 ADULT GENERAL MEDICAL EXAM: Primary | ICD-10-CM

## 2024-04-10 DIAGNOSIS — Z13.220 SCREENING FOR CHOLESTEROL LEVEL: ICD-10-CM

## 2024-04-10 DIAGNOSIS — Z11.4 SCREENING FOR HIV (HUMAN IMMUNODEFICIENCY VIRUS): ICD-10-CM

## 2024-04-10 DIAGNOSIS — F10.10 ALCOHOL ABUSE: ICD-10-CM

## 2024-04-10 DIAGNOSIS — R51.9 NONINTRACTABLE HEADACHE, UNSPECIFIED CHRONICITY PATTERN, UNSPECIFIED HEADACHE TYPE: ICD-10-CM

## 2024-04-10 DIAGNOSIS — I10 HYPERTENSION, UNSPECIFIED TYPE: ICD-10-CM

## 2024-04-10 PROCEDURE — 99999 PR PBB SHADOW E&M-EST. PATIENT-LVL V: CPT | Mod: PBBFAC,,,

## 2024-04-10 PROCEDURE — 99214 OFFICE O/P EST MOD 30 MIN: CPT | Mod: S$GLB,,,

## 2024-04-10 PROCEDURE — 99215 OFFICE O/P EST HI 40 MIN: CPT | Mod: PBBFAC,PN

## 2024-04-10 RX ORDER — AMLODIPINE BESYLATE 10 MG/1
10 TABLET ORAL DAILY
Qty: 30 TABLET | Refills: 3 | Status: SHIPPED | OUTPATIENT
Start: 2024-04-10 | End: 2024-05-08

## 2024-04-10 NOTE — PROGRESS NOTES
"Subjective     Patient ID: Gus Valles Jr. is a 48 y.o. male.    Chief Complaint: Establish care    Gus Valles Jr. is a 48 year old male, presents to clinic for general medical exam and establish care.  New to me. No PCP. Medical and surgical history, in addition to problem list reviewed and listed below.    Recent visit to [place] ED 03/30/2024 with complain of throbbing left temporal headache, constant left chest wall pain, nausea, dyspnea, and increased blood pressure.      Patient reports been doing alcohol binges 1 - 2 weeks prior to ED visit   States binging set ups included "vodka, Aneudy, any alcohol, wine, whiskey and moonshine". Last binge was with moonshine on Good Friday; went to ED because felt like had alcohol poisoning.  Has taken a combination blood pressure medication in the past, but stopped once medication was running low or start feeling better. States in the past has only taken medication prescribed by ED provider with occurrences but no establishment of care afterward; goes back to ED if feels bad. Reports been compliant with amlodipine since prescribed; however, questioning when can stop taking. Feels he can control his blood pressure, emphasizing he already eat grilled meat and baked fish.    Headache   This is a recurrent problem. The current episode started 1 to 4 weeks ago. The problem occurs intermittently. The problem has been gradually improving. The pain is located in the Left unilateral region. The pain does not radiate. The quality of the pain is described as pulsating (Feel vein). The pain is at a severity of 6/10 (No pain at present). Associated symptoms include dizziness. Pertinent negatives include no abdominal pain, back pain, blurred vision, ear pain, eye pain, fever, loss of balance, neck pain, numbness, photophobia, seizures, sinus pressure, sore throat, visual change or vomiting. The symptoms are aggravated by alcohol. He has tried nothing for the symptoms. His past " medical history is significant for hypertension. There is no history of migraine headaches or sinus disease.       Review of Systems   Constitutional:  Positive for fatigue. Negative for appetite change, chills and fever.   HENT:  Negative for nasal congestion, drooling, ear pain, postnasal drip, sinus pressure/congestion, sore throat, trouble swallowing and voice change.    Eyes:  Negative for blurred vision, photophobia, pain, discharge and visual disturbance.   Respiratory:  Positive for chest tightness. Negative for shortness of breath.    Cardiovascular:  Negative for chest pain, palpitations and leg swelling.   Gastrointestinal:  Negative for abdominal pain, change in bowel habit, constipation, diarrhea, rectal pain and vomiting.   Endocrine: Negative for cold intolerance and heat intolerance.   Genitourinary:  Negative for difficulty urinating, discharge, erectile dysfunction, hematuria, penile pain, penile swelling, scrotal swelling and testicular pain.   Musculoskeletal:  Negative for arthralgias, back pain, gait problem, neck pain and neck stiffness.   Allergic/Immunologic: Negative for environmental allergies and food allergies.   Neurological:  Positive for dizziness and headaches. Negative for vertigo, tremors, seizures, syncope, speech difficulty, numbness, loss of balance and coordination difficulties.   Psychiatric/Behavioral:  Negative for sleep disturbance. The patient is not nervous/anxious.      Past Medical History:   Diagnosis Date    Hypertension        Past Surgical History:   Procedure Laterality Date    CHOLECYSTECTOMY         Social History     Socioeconomic History    Marital status: Single   Tobacco Use    Smoking status: Never    Smokeless tobacco: Never   Substance and Sexual Activity    Alcohol use: Yes     Alcohol/week: 3.0 standard drinks of alcohol     Types: 3 Drinks containing 0.5 oz of alcohol per week     Comment: social use    Drug use: No    Sexual activity: Yes     Partners:  "Female     Birth control/protection: None         Objective     Vitals:    04/10/24 1414 04/10/24 1415 04/10/24 1450   BP: (!) 183/107 (!) 178/100 (!) 158/90   BP Location: Right arm Left arm Right arm   Patient Position: Sitting Lying Sitting   BP Method: Medium (Automatic) Medium (Automatic) Large (Manual)   Pulse: 99     Resp: 16     Temp: 98.6 °F (37 °C)     TempSrc: Oral     SpO2: 100%     Weight: 105.2 kg (231 lb 14.8 oz)     Height: 5' 8.4" (1.737 m)          Physical Exam  Constitutional:       Appearance: Normal appearance.   HENT:      Head: Normocephalic.      Right Ear: Tympanic membrane, ear canal and external ear normal.      Left Ear: Tympanic membrane, ear canal and external ear normal.      Nose: Nose normal.      Mouth/Throat:      Mouth: Mucous membranes are moist.      Pharynx: Oropharynx is clear.   Eyes:      General: No scleral icterus.        Right eye: No discharge.         Left eye: No discharge.      Extraocular Movements: Extraocular movements intact.      Conjunctiva/sclera: Conjunctivae normal.      Pupils: Pupils are equal, round, and reactive to light.   Cardiovascular:      Rate and Rhythm: Normal rate and regular rhythm.      Pulses: Normal pulses.      Heart sounds: No murmur heard.  Pulmonary:      Effort: Pulmonary effort is normal.      Breath sounds: Normal breath sounds.   Abdominal:      General: Bowel sounds are normal. There is distension.      Palpations: Abdomen is soft.      Tenderness: There is no abdominal tenderness. There is no guarding or rebound.   Musculoskeletal:         General: No swelling. Normal range of motion.      Cervical back: Normal range of motion and neck supple.   Skin:     General: Skin is warm and dry.      Capillary Refill: Capillary refill takes less than 2 seconds.      Findings: No rash.   Neurological:      Mental Status: He is alert and oriented to person, place, and time.   Psychiatric:         Mood and Affect: Mood normal.         Behavior: " Behavior normal.        Assessment and Plan     1. Adult general medical exam  -     HEPATITIS C ANTIBODY; Future; Expected date: 04/11/2024    2. Hypertension, unspecified type  -     amLODIPine (NORVASC) 10 MG tablet; Take 1 tablet (10 mg total) by mouth once daily.  Dispense: 30 tablet; Refill: 3        -     Monitor blood pressure twice a day; keep log.  Blood Pressure Log given.         Bring log to next office visit and/or upload in My Portal.    3. BMI 34.0-34.9,adult        -  Eat at regular intervals, a balanced meal, that include lean protein (as in turkey,         chicken, fish), fruit and vegetables without added sugar. Limit sodium (salt) intake;         limit seafood. Avoid fried and fast foods. Stay hydrated; avoid sodas, sugary         drinks and alcohol intake. Exercise, as in walking, at least three times a week for         at least 30 minutes; may increase as tolerated.         4. Alcohol abuse          - Avoid alcohol binging; set a limit on amount and strength of drinking.            Implement several drink drink free days weekly.  Stay hydrated with water.            Drinking a lot of alcohol can affect affect your blood vessels, cause them to           become narrow, which in turn, increases blood pressure. Drinking can affect your           judgement and behavior.and disrupt your sleep patterns.    5. Nonintractable headache, unspecified chronicity pattern, unspecified headache type          - Avoid triggers, alcohol use.          6. Screening for diabetes mellitus  -     Hemoglobin A1C; Future; Expected date: 04/11/2024    7. Screening for cholesterol level  -     Lipid Panel; Future; Expected date: 04/11/2024    8. Screening for HIV (human immunodeficiency virus)  -     HIV 1/2 Ag/Ab (4th Gen); Future; Expected date: 04/11/2024    9. Screening examination for STI  -     Cancel: C. trachomatis/N. gonorrhoeae by AMP DNA [facility]; Urine  -     C. trachomatis/N. gonorrhoeae by AMP DNA [facility];  Urine; Future; Expected date: 04/11/2024        -     RPR; Future; Expected date: 04/10/2024    10. Family history of prostate cancer  -     PSA, Screening, Future, Expected date: 04/11/2024        Follow up in about 2 weeks (around 4/24/2024) for blood pressure check.    Lori A. Stephens Sandifer, BECKYP-C

## 2024-04-11 ENCOUNTER — LAB VISIT (OUTPATIENT)
Dept: PRIMARY CARE CLINIC | Facility: CLINIC | Age: 49
End: 2024-04-11
Payer: COMMERCIAL

## 2024-04-11 DIAGNOSIS — Z11.4 SCREENING FOR HIV (HUMAN IMMUNODEFICIENCY VIRUS): ICD-10-CM

## 2024-04-11 DIAGNOSIS — Z00.00 ADULT GENERAL MEDICAL EXAM: ICD-10-CM

## 2024-04-11 DIAGNOSIS — Z80.42 FAMILY HISTORY OF PROSTATE CANCER: ICD-10-CM

## 2024-04-11 DIAGNOSIS — Z13.220 SCREENING FOR CHOLESTEROL LEVEL: ICD-10-CM

## 2024-04-11 DIAGNOSIS — Z13.1 SCREENING FOR DIABETES MELLITUS: ICD-10-CM

## 2024-04-11 DIAGNOSIS — Z11.3 SCREENING EXAMINATION FOR STI: ICD-10-CM

## 2024-04-11 LAB
CHOLEST SERPL-MCNC: 183 MG/DL (ref 120–199)
CHOLEST/HDLC SERPL: 4.2 {RATIO} (ref 2–5)
ESTIMATED AVG GLUCOSE: 94 MG/DL (ref 68–131)
HBA1C MFR BLD: 4.9 % (ref 4–5.6)
HCV AB SERPL QL IA: NORMAL
HDLC SERPL-MCNC: 44 MG/DL (ref 40–75)
HDLC SERPL: 24 % (ref 20–50)
HIV 1+2 AB+HIV1 P24 AG SERPL QL IA: NORMAL
LDLC SERPL CALC-MCNC: 121.6 MG/DL (ref 63–159)
NONHDLC SERPL-MCNC: 139 MG/DL
TRIGL SERPL-MCNC: 87 MG/DL (ref 30–150)

## 2024-04-11 PROCEDURE — 86803 HEPATITIS C AB TEST: CPT

## 2024-04-11 PROCEDURE — 87389 HIV-1 AG W/HIV-1&-2 AB AG IA: CPT

## 2024-04-11 PROCEDURE — 36415 COLL VENOUS BLD VENIPUNCTURE: CPT

## 2024-04-11 PROCEDURE — 86592 SYPHILIS TEST NON-TREP QUAL: CPT

## 2024-04-11 PROCEDURE — 80061 LIPID PANEL: CPT

## 2024-04-11 PROCEDURE — 83036 HEMOGLOBIN GLYCOSYLATED A1C: CPT

## 2024-04-12 LAB — RPR SER QL: NORMAL

## 2024-04-12 NOTE — PROGRESS NOTES
Labs been reviewed.  Discussed lab results with patient; verbalized understands.  Informed patient how to address password setting for Patient Portal.

## 2024-04-24 ENCOUNTER — CLINICAL SUPPORT (OUTPATIENT)
Dept: PRIMARY CARE CLINIC | Facility: CLINIC | Age: 49
End: 2024-04-24
Payer: COMMERCIAL

## 2024-04-24 VITALS
DIASTOLIC BLOOD PRESSURE: 88 MMHG | HEART RATE: 81 BPM | WEIGHT: 233.38 LBS | BODY MASS INDEX: 35.07 KG/M2 | SYSTOLIC BLOOD PRESSURE: 144 MMHG

## 2024-04-24 DIAGNOSIS — Z01.30 BLOOD PRESSURE CHECK: Primary | ICD-10-CM

## 2024-04-24 PROCEDURE — 99999 PR PBB SHADOW E&M-EST. PATIENT-LVL II: CPT | Mod: PBBFAC,,,

## 2024-04-24 PROCEDURE — 99212 OFFICE O/P EST SF 10 MIN: CPT | Mod: PBBFAC,PN

## 2024-04-24 NOTE — PROGRESS NOTES
Gus Valles Jr. 48 y.o. male is here today for Blood Pressure check.   History of HTN yes.    Review of patient's allergies indicates:  No Known Allergies  Creatinine   Date Value Ref Range Status   03/31/2024 1.2 0.5 - 1.4 mg/dL Final     Sodium   Date Value Ref Range Status   03/31/2024 144 136 - 145 mmol/L Final     Potassium   Date Value Ref Range Status   03/31/2024 3.9 3.5 - 5.1 mmol/L Final   ]  Patient verifies taking blood pressure medications on a regular basis at the same time of the day.     Current Outpatient Medications:     amLODIPine (NORVASC) 10 MG tablet, Take 1 tablet (10 mg total) by mouth once daily., Disp: 30 tablet, Rfl: 3    amoxicillin-clavulanate 875-125mg (AUGMENTIN) 875-125 mg per tablet, Take 1 tablet by mouth 2 (two) times daily. (Patient not taking: Reported on 4/10/2024), Disp: 14 tablet, Rfl: 0    aspirin 81 MG Chew, Take 81 mg by mouth once daily. (Patient not taking: Reported on 4/10/2024), Disp: , Rfl:     HYDROcodone-acetaminophen (NORCO) 5-325 mg per tablet, Take 1 tablet by mouth every 8 (eight) hours as needed for Pain. (Patient not taking: Reported on 4/10/2024), Disp: 15 tablet, Rfl: 0    lisinopriL-hydrochlorothiazide (PRINZIDE,ZESTORETIC) 10-12.5 mg per tablet, Take 1 tablet by mouth once daily. Establish care with a primary physician for further management. (Patient not taking: Reported on 4/10/2024), Disp: 30 tablet, Rfl: 0  Does patient have record of home blood pressure readings yes. Readings have been averaging wnl.   Last dose of blood pressure medication was taken at last night. Pt complains Norvasc causes him drowsiness.  Patient is symptomatic.   Complains of nothing.    Vitals:    04/24/24 1013 04/24/24 1014   BP: (!) 144/87 (!) 144/88   Pulse: 77 81   Weight: 105.8 kg (233 lb 5.7 oz)          Dr. Pena informed of nurse visit. Pt request to be have a virtual appt with Dr. Pena 5/1 @ 1 pm

## 2024-04-29 ENCOUNTER — HOSPITAL ENCOUNTER (EMERGENCY)
Facility: HOSPITAL | Age: 49
Discharge: HOME | End: 2024-04-29
Attending: STUDENT IN AN ORGANIZED HEALTH CARE EDUCATION/TRAINING PROGRAM
Payer: COMMERCIAL

## 2024-04-29 VITALS
WEIGHT: 231 LBS | BODY MASS INDEX: 35.01 KG/M2 | OXYGEN SATURATION: 97 % | TEMPERATURE: 98 F | HEART RATE: 94 BPM | SYSTOLIC BLOOD PRESSURE: 130 MMHG | RESPIRATION RATE: 19 BRPM | HEIGHT: 68 IN | DIASTOLIC BLOOD PRESSURE: 76 MMHG

## 2024-04-29 DIAGNOSIS — F10.929 ALCOHOLIC INTOXICATION WITH COMPLICATION: Primary | ICD-10-CM

## 2024-04-29 PROCEDURE — 99283 EMERGENCY DEPT VISIT LOW MDM: CPT

## 2024-04-29 NOTE — DISCHARGE INSTRUCTIONS
Thank you for coming to our Emergency Department today. It is important to remember that some problems are difficult to diagnose and may not be found during your first visit. Be sure to follow up with your primary care doctor and review any labs/imaging that was performed with them. If you do not have a primary care doctor, you may contact the one listed on your discharge paperwork or you may also call the [facility] Appointment Desk at [phone] to schedule an appointment with one.     All medications may potentially have side effects and it is impossible to predict which medications may give you side effects. If you feel that you are having a negative effect of any medication you should immediately stop taking them and seek medical attention.    Return to the ER with any questions/concerns, new/concerning symptoms, worsening or failure to improve. Do not drive or make any important decisions for 24 hours if you have received any pain medications, sedatives or mood altering drugs during your ER visit.

## 2024-04-29 NOTE — ED PROVIDER NOTES
"Encounter Date: 4/29/2024       History     Chief Complaint   Patient presents with    Altered Mental Status     Per EMS patient was [place] beside a car [place]  Patient unable to recall incident.  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  Patient without complaint.  Awake.  Alert.  Oriented x person, place, and time.  Speech clear and appropriate.     48 y.o. male who has a past medical history of Hypertension brought into the emergency department by EMS due to patient being [place] lying [place] the [place]  Patient reports he does not recall events surrounding incident and does not know why he is in the emergency department.  Does report drinking alcohol and currently states he has a functional alcoholic.  He denies any illicit drug use.  Currently denies any headache chest pain or abdominal pain.  Denies any nausea or vomiting.  Denies any fevers or chills.  Patient reports "I make too much money to be in this situation"     Spoke with patient's cousin who has not sure what may have transpired today attempting to reach patient's mother who may be able to pick him up from the hospital.    The history is provided by the patient and the EMS personnel.     Review of patient's allergies indicates:  No Known Allergies  Past Medical History:   Diagnosis Date    Hypertension      Past Surgical History:   Procedure Laterality Date    CHOLECYSTECTOMY       Family History   Problem Relation Name Age of Onset    Hypertension Mother      Diabetes Father      Hypertension Father      Prostate cancer Maternal Grandfather       Social History     Tobacco Use    Smoking status: Never    Smokeless tobacco: Never   Substance Use Topics    Alcohol use: Yes     Alcohol/week: 3.0 standard drinks of alcohol     Types: 3 Drinks containing 0.5 oz of alcohol per week     Comment: social use    Drug use: No     Review of " Systems   Constitutional:  Negative for chills and fever.   HENT:  Negative for congestion and rhinorrhea.    Eyes:  Negative for pain.   Respiratory:  Negative for cough and shortness of breath.    Cardiovascular:  Negative for chest pain and leg swelling.   Gastrointestinal:  Negative for abdominal pain, nausea and vomiting.   Genitourinary:  Negative for dysuria and hematuria.   Musculoskeletal:  Negative for joint swelling and neck pain.   Skin:  Negative for rash.   Neurological:  Negative for weakness and headaches.       Physical Exam     Initial Vitals [04/29/24 0151]   BP Pulse Resp Temp SpO2   (!) 133/90 110 15 98 °F (36.7 °C) 99 %      MAP       --         Physical Exam    Nursing note and vitals reviewed.  Constitutional: He is not diaphoretic. No distress.   HENT:   Head: Normocephalic and atraumatic.   Eyes: Conjunctivae and EOM are normal. Pupils are equal, round, and reactive to light.   Neck:   Normal range of motion.  Cardiovascular:  Regular rhythm.           Pulses:       Radial pulses are 2+ on the right side and 2+ on the left side.        Posterior tibial pulses are 2+ on the right side and 2+ on the left side.   Pulmonary/Chest: Breath sounds normal. No respiratory distress.   Abdominal: Abdomen is soft. Bowel sounds are normal. He exhibits no distension. There is no abdominal tenderness. There is no rebound and no guarding.   Musculoskeletal:         General: No tenderness. Normal range of motion.      Cervical back: Normal range of motion.     Lymphadenopathy:     He has no cervical adenopathy.   Neurological: He is alert and oriented to person, place, and time.   Skin: Skin is warm. Capillary refill takes less than 2 seconds.   Psychiatric: His behavior is normal. His mood appears anxious. His speech is rapid and/or pressured.         ED Course   Procedures  Labs Reviewed - No data to display       Imaging Results    None          Medications - No data to display  Medical Decision Making:    Initial Assessment:   48 y.o. male who has a past medical history of Hypertension brought into the emergency department by EMS due to patient being [place]  Patient mildly anxious, vitals notable for tachycardia otherwise unremarkable.  No signs of head trauma on exam or other signs of trauma.  Suspect patient is intoxicated alcohol.  Currently patient has no complaints.  Will observe patient in the emergency department until clinical sobriety.  Will await for family members to come to bedside and discharge patient  Differential Diagnosis:   Differential Diagnosis includes, but is not limited to:  CVA/TIA, seizure, status epilepticus, post-ictal state, meningitis/encephalitis, sepsis, MI/ACS, arrhythmia, syncope, intracranial mass/hemorrhage, head trauma, anaphylaxis, substance abuse, alcohol intoxication/withdrawal, medication reaction, intentional medication overdose, neuroleptic malignant syndrome, serotonin syndrome, CO poisoning, hypoxia/hypercapnea, hepatic encephalopathy, metabolic disturbance, thyroid disease, hypoglycemia.              ED Course as of 04/29/24 0231   Mon Apr 29, 2024   0219 Pts family member at bedside. Pt is currently stable for discharge. I see no indication of an emergent process beyond that addressed during our encounter but have duly counseled the patient/family regarding the need for prompt follow-up as well as the indications that should prompt immediate return to the emergency room should new or worrisome developments occur. I discussed the ED work up and diagnostic findings with the patient/family. The patient/family has been provided with verbal and printed direction regarding our final diagnosis(es) as well as instructions regarding use of OTC and/or Rx medications intended to manage the patient's aforementioned conditions. The patient/family verbalized an understanding. The patient/family is asked if there are any questions or  concerns. We discuss the case, until all issues are addressed to the patient/family's satisfaction. Patient/family understands and is agreeable to the plan.  [AS]      ED Course User Index  [AS] Jennifer Mcneil MD          Medical Decision Making         Clinical Impression:   Final diagnoses:  [F10.929] Alcoholic intoxication with complication (Primary)          ED Disposition Condition    Discharge Stable          ED Prescriptions    None       Follow-up Information       Follow up With Specialties Details Why Contact Info    [facility] - Emergency Dept Emergency Medicine  As needed [address] [facility] [address] [phone]    primary care doctor  Schedule an appointment as soon as possible for a visit  for reassesment             DISCLAIMER: This note was prepared with [employer] voice recognition transcription software. Garbled syntax, mangled pronouns, and other bizarre constructions may be attributed to that software system.     Jennifer Mcneil MD  04/29/24 7355

## 2024-04-29 NOTE — ED NOTES
See triage notes.  Patient without complaint.  Though he does not recall incident reported by EMS patient is otherwise oriented x 4 with a steady gait.     Pain:  Rated 0/10.     Psychosocial:  Patient is calm and cooperative.  Patients insight and judgement are appropriate to situation.  Appears clean, well maintained, with clothing appropriate to environment.  No evidence of delusions, hallucinations, or psychosis.     Neuro:  Eyes open spontaneously.  Awake, alert, oriented x 4.  Speech clear and appropriate.  Tolerating saliva secretions well.  Able to follow commands, demonstrating ability to actively and appropriately communicate within context of current conversation.  Symmetrical facial muscles.  Moving all extremities well with no noted weakness.  Adequate muscle tone present.    Movement is purposeful.  No evidence of impaired sensation.  Responds to external stimuli with appropriate reflexes.  Pupils equally round and reactive to light.  No noted drifts.       Airway:  Bilateral chest rise and fall.  RR regular and non-labored.  No crepitus or subcutaneous emphysema noted on palpation.       Circulatory:  Skin warm, dry, and pink.  Radial pulses strong and regular.  Capillary refill/skin blanching less than 3 seconds to distal of 4 extremities.     Abdomen:  Abdomen soft and non-distended.         Urinary:  Patient without complaint.     Extremities:  No redness, heat, swelling, deformity, or pain.     Skin:  Intact with no bruising/discolorations noted..

## 2024-05-08 ENCOUNTER — TELEPHONE (OUTPATIENT)
Dept: PRIMARY CARE CLINIC | Facility: CLINIC | Age: 49
End: 2024-05-08
Payer: COMMERCIAL

## 2024-05-08 DIAGNOSIS — I10 HYPERTENSION, UNSPECIFIED TYPE: ICD-10-CM

## 2024-05-08 RX ORDER — AMLODIPINE BESYLATE 10 MG/1
10 TABLET ORAL DAILY
Qty: 90 TABLET | Refills: 3 | Status: SHIPPED | OUTPATIENT
Start: 2024-05-08

## 2024-05-08 NOTE — TELEPHONE ENCOUNTER
----- Message from Yue Dean sent at 5/8/2024  2:41 PM CDT -----  Contact: [phone]  Requesting an RX refill or new RX.  Is this a refill or new RX: Refill 1  RX name and strength (copy/paste from chart):  amLODIPine (NORVASC) 10 MG tablet  Is this a 30 day or 90 day RX:   Pharmacy name and phone # (copy/paste from chart):  "[id number] - [place], [place] - [address] AT [facility] [address]   Phone: [phone]  Fax: [phone]        The doctors have asked that we provide their patients with the following 2 reminders -- prescription refills can take up to 72 hours, and a friendly reminder that in the future you can use your [facility] account to request refills:     Patient stated that he was seen on 04/24 by a nurse, asked for rx to be sent to [facility] in [place] but he is been told that is not there, if is possible to sent to [facility] in [place] and [address]. Please call and advise. Thank you.

## 2024-09-08 DIAGNOSIS — U07.1 COVID-19 VIRUS DETECTED: ICD-10-CM

## 2024-09-11 ENCOUNTER — NURSE TRIAGE (OUTPATIENT)
Dept: ADMINISTRATIVE | Facility: CLINIC | Age: 49
End: 2024-09-11
Payer: COMMERCIAL

## 2024-09-11 NOTE — TELEPHONE ENCOUNTER
Patient responded to the home covid symptom monitoring system. Called twice and left 2 messages on unidentified voice mail. No contact made with patient by this nurse.     Reason for Disposition   Message left on unidentified voice mail.  Phone number verified.    Protocols used: No Contact or Duplicate Contact Call-A-

## 2025-08-04 ENCOUNTER — PATIENT OUTREACH (OUTPATIENT)
Facility: OTHER | Age: 50
End: 2025-08-04
Payer: COMMERCIAL